# Patient Record
Sex: MALE | Race: WHITE | NOT HISPANIC OR LATINO | Employment: FULL TIME | URBAN - METROPOLITAN AREA
[De-identification: names, ages, dates, MRNs, and addresses within clinical notes are randomized per-mention and may not be internally consistent; named-entity substitution may affect disease eponyms.]

---

## 2017-01-04 ENCOUNTER — ALLSCRIPTS OFFICE VISIT (OUTPATIENT)
Dept: OTHER | Facility: OTHER | Age: 52
End: 2017-01-04

## 2017-01-04 ENCOUNTER — GENERIC CONVERSION - ENCOUNTER (OUTPATIENT)
Dept: OTHER | Facility: OTHER | Age: 52
End: 2017-01-04

## 2017-04-12 ENCOUNTER — ALLSCRIPTS OFFICE VISIT (OUTPATIENT)
Dept: OTHER | Facility: OTHER | Age: 52
End: 2017-04-12

## 2017-04-12 DIAGNOSIS — M25.50 PAIN IN JOINT: ICD-10-CM

## 2017-04-12 DIAGNOSIS — M54.50 LOW BACK PAIN: ICD-10-CM

## 2017-04-12 DIAGNOSIS — M54.16 RADICULOPATHY OF LUMBAR REGION: ICD-10-CM

## 2017-04-13 ENCOUNTER — APPOINTMENT (OUTPATIENT)
Dept: LAB | Facility: HOSPITAL | Age: 52
End: 2017-04-13
Payer: COMMERCIAL

## 2017-04-13 ENCOUNTER — TRANSCRIBE ORDERS (OUTPATIENT)
Dept: ADMINISTRATIVE | Facility: HOSPITAL | Age: 52
End: 2017-04-13

## 2017-04-13 DIAGNOSIS — M25.50 PAIN IN JOINT: ICD-10-CM

## 2017-04-13 DIAGNOSIS — M54.16 RADICULOPATHY OF LUMBAR REGION: ICD-10-CM

## 2017-04-13 DIAGNOSIS — M54.50 LOW BACK PAIN: ICD-10-CM

## 2017-04-13 LAB
CK SERPL-CCNC: 118 U/L (ref 39–308)
CRP SERPL QL: 5.8 MG/L
ERYTHROCYTE [DISTWIDTH] IN BLOOD BY AUTOMATED COUNT: 12.4 % (ref 11.6–15.1)
ERYTHROCYTE [SEDIMENTATION RATE] IN BLOOD: 3 MM/HOUR (ref 0–10)
HCT VFR BLD AUTO: 52.1 % (ref 36.5–49.3)
HGB BLD-MCNC: 17.6 G/DL (ref 12–17)
MCH RBC QN AUTO: 33 PG (ref 26.8–34.3)
MCHC RBC AUTO-ENTMCNC: 33.8 G/DL (ref 31.4–37.4)
MCV RBC AUTO: 98 FL (ref 82–98)
PLATELET # BLD AUTO: 212 THOUSANDS/UL (ref 149–390)
PMV BLD AUTO: 10.9 FL (ref 8.9–12.7)
RBC # BLD AUTO: 5.34 MILLION/UL (ref 3.88–5.62)
URATE SERPL-MCNC: 9.6 MG/DL (ref 4.2–8)
WBC # BLD AUTO: 5.75 THOUSAND/UL (ref 4.31–10.16)

## 2017-04-13 PROCEDURE — 82550 ASSAY OF CK (CPK): CPT

## 2017-04-13 PROCEDURE — 86618 LYME DISEASE ANTIBODY: CPT

## 2017-04-13 PROCEDURE — 87798 DETECT AGENT NOS DNA AMP: CPT

## 2017-04-13 PROCEDURE — 86038 ANTINUCLEAR ANTIBODIES: CPT

## 2017-04-13 PROCEDURE — 82085 ASSAY OF ALDOLASE: CPT

## 2017-04-13 PROCEDURE — 36415 COLL VENOUS BLD VENIPUNCTURE: CPT

## 2017-04-13 PROCEDURE — 86235 NUCLEAR ANTIGEN ANTIBODY: CPT

## 2017-04-13 PROCEDURE — 87476 LYME DIS DNA AMP PROBE: CPT

## 2017-04-13 PROCEDURE — 86256 FLUORESCENT ANTIBODY TITER: CPT

## 2017-04-13 PROCEDURE — 86430 RHEUMATOID FACTOR TEST QUAL: CPT

## 2017-04-13 PROCEDURE — 85027 COMPLETE CBC AUTOMATED: CPT

## 2017-04-13 PROCEDURE — 86039 ANTINUCLEAR ANTIBODIES (ANA): CPT

## 2017-04-13 PROCEDURE — 85652 RBC SED RATE AUTOMATED: CPT

## 2017-04-13 PROCEDURE — 81374 HLA I TYPING 1 ANTIGEN LR: CPT

## 2017-04-13 PROCEDURE — 86225 DNA ANTIBODY NATIVE: CPT

## 2017-04-13 PROCEDURE — 86140 C-REACTIVE PROTEIN: CPT

## 2017-04-13 PROCEDURE — 82164 ANGIOTENSIN I ENZYME TEST: CPT

## 2017-04-13 PROCEDURE — 84550 ASSAY OF BLOOD/URIC ACID: CPT

## 2017-04-14 LAB
ACE SERPL-CCNC: 34 U/L (ref 14–82)
ALDOLASE SERPL-CCNC: 9.1 U/L (ref 3.3–10.3)
ANA HOMOGEN SER QL IF: NORMAL
ANA HOMOGEN TITR SER: NORMAL {TITER}
B BURGDOR IGG SER IA-ACNC: 0.09
B BURGDOR IGM SER IA-ACNC: 0.15
DSDNA AB SER-ACNC: <1 IU/ML (ref 0–9)
ENA SS-A AB SER-ACNC: <0.2 AI (ref 0–0.9)
ENA SS-B AB SER-ACNC: <0.2 AI (ref 0–0.9)
MITOCHONDRIA M2 IGG SER-ACNC: 2.1 UNITS (ref 0–20)
RHEUMATOID FACT SER QL LA: NEGATIVE
RYE IGE QN: POSITIVE

## 2017-04-16 LAB — B BURGDOR DNA SPEC QL NAA+PROBE: NEGATIVE

## 2017-04-17 LAB — A PHAGOCYTOPH DNA BLD QL NAA+PROBE: NEGATIVE

## 2017-04-21 ENCOUNTER — GENERIC CONVERSION - ENCOUNTER (OUTPATIENT)
Dept: OTHER | Facility: OTHER | Age: 52
End: 2017-04-21

## 2017-04-21 LAB — HLA-B27 QL NAA+PROBE: NEGATIVE

## 2017-06-05 ENCOUNTER — APPOINTMENT (OUTPATIENT)
Dept: LAB | Facility: HOSPITAL | Age: 52
End: 2017-06-05
Payer: COMMERCIAL

## 2017-06-05 ENCOUNTER — GENERIC CONVERSION - ENCOUNTER (OUTPATIENT)
Dept: OTHER | Facility: OTHER | Age: 52
End: 2017-06-05

## 2017-06-05 ENCOUNTER — TRANSCRIBE ORDERS (OUTPATIENT)
Dept: ADMINISTRATIVE | Facility: HOSPITAL | Age: 52
End: 2017-06-05

## 2017-06-05 DIAGNOSIS — M10.9 GOUT: ICD-10-CM

## 2017-06-05 LAB — URATE SERPL-MCNC: 8.3 MG/DL (ref 4.2–8)

## 2017-06-05 PROCEDURE — 84550 ASSAY OF BLOOD/URIC ACID: CPT

## 2017-06-05 PROCEDURE — 36415 COLL VENOUS BLD VENIPUNCTURE: CPT

## 2017-06-21 DIAGNOSIS — M10.9 GOUT: ICD-10-CM

## 2017-09-05 ENCOUNTER — ALLSCRIPTS OFFICE VISIT (OUTPATIENT)
Dept: OTHER | Facility: OTHER | Age: 52
End: 2017-09-05

## 2017-09-05 DIAGNOSIS — R05.9 COUGH: ICD-10-CM

## 2017-09-06 ENCOUNTER — TRANSCRIBE ORDERS (OUTPATIENT)
Dept: ADMINISTRATIVE | Facility: HOSPITAL | Age: 52
End: 2017-09-06

## 2017-09-06 ENCOUNTER — HOSPITAL ENCOUNTER (OUTPATIENT)
Dept: RADIOLOGY | Facility: HOSPITAL | Age: 52
Discharge: HOME | End: 2017-09-06
Payer: COMMERCIAL

## 2017-09-06 DIAGNOSIS — R05.9 COUGH: ICD-10-CM

## 2017-09-06 DIAGNOSIS — R05.9 COUGH: Primary | ICD-10-CM

## 2017-09-06 PROCEDURE — 71020 HB CHEST X-RAY 2VW FRONTAL&LATL: CPT

## 2017-09-08 ENCOUNTER — GENERIC CONVERSION - ENCOUNTER (OUTPATIENT)
Dept: OTHER | Facility: OTHER | Age: 52
End: 2017-09-08

## 2018-01-10 NOTE — RESULT NOTES
Verified Results  (1) HLA B27 13Apr2017 11:00AM Asif Stover    Order Number: [id number]     Test Name Result Flag Reference   HLA B27 Negative     HLA-B*27 Negative  HLA allele interpretation for all loci based on IMGT/HLA  database version 3 25 0  [facility] CLIA ID Number [id number]  This test was performed using PCR (Polymerase Chain Reaction)/SSOP  (Sequence Specific Oligonucleotide Probes) technique  SBT (Sequence  Based Typing) and/or SSP (Sequence Specific Primers) may be used as  supplemental methods when necessary  Please contact HLA Customer  Service at [phone] if you have any questions     Director of [facility]   Dr Amandeep Ferrell, PhD    Performed at:  [address]  [id number]  : Anyi Campos PhD, Phone:  [phone]

## 2018-01-12 NOTE — RESULT NOTES
Verified Results  (1) URIC ACID [id number] 10:43AM [facility] [address] Order Number: [id number]     Test Name Result Flag Reference   URIC ACID 8 3 mg/dL H 4 2-8 0   Specimen collection should occur prior to Metamizole administration due to the potential for falsely depressed results

## 2018-01-13 VITALS
TEMPERATURE: 100.1 F | HEIGHT: 71 IN | DIASTOLIC BLOOD PRESSURE: 82 MMHG | WEIGHT: 263.25 LBS | SYSTOLIC BLOOD PRESSURE: 124 MMHG | HEART RATE: 71 BPM | BODY MASS INDEX: 36.85 KG/M2 | OXYGEN SATURATION: 97 %

## 2018-01-14 VITALS
OXYGEN SATURATION: 94 % | HEART RATE: 94 BPM | DIASTOLIC BLOOD PRESSURE: 62 MMHG | SYSTOLIC BLOOD PRESSURE: 126 MMHG | BODY MASS INDEX: 36.05 KG/M2 | HEIGHT: 71 IN | WEIGHT: 257.5 LBS | TEMPERATURE: 98.2 F

## 2018-01-16 NOTE — RESULT NOTES
Verified Results  * XR CHEST PA & LATERAL [id number] 12:26PM [facility]     Test Name Result Flag Reference   XR CHEST PA & LATERAL (Report)     CHEST      INDICATION: Cough     COMPARISON: None     VIEWS: Frontal and lateral projections     IMAGES: 4     FINDINGS:        Cardiomediastinal silhouette appears unremarkable  The lungs are clear  No pneumothorax or pleural effusion  Visualized osseous structures appear within normal limits for the patient's age         IMPRESSION:     No acute consolidation or congestion seen   Hypoventilated lungs       Workstation performed: [id number]     Signed by:   Roland Colón MD   9/8/17

## 2018-01-22 VITALS
BODY MASS INDEX: 37.27 KG/M2 | HEART RATE: 97 BPM | OXYGEN SATURATION: 95 % | TEMPERATURE: 98.9 F | HEIGHT: 71 IN | WEIGHT: 266.25 LBS

## 2018-02-13 ENCOUNTER — APPOINTMENT (OUTPATIENT)
Dept: LAB | Facility: HOSPITAL | Age: 53
End: 2018-02-13
Attending: INTERNAL MEDICINE
Payer: COMMERCIAL

## 2018-02-13 ENCOUNTER — OFFICE VISIT (OUTPATIENT)
Dept: INTERNAL MEDICINE CLINIC | Facility: CLINIC | Age: 53
End: 2018-02-13
Payer: COMMERCIAL

## 2018-02-13 ENCOUNTER — HOSPITAL ENCOUNTER (OUTPATIENT)
Dept: RADIOLOGY | Facility: HOSPITAL | Age: 53
Discharge: HOME | End: 2018-02-13
Attending: INTERNAL MEDICINE
Payer: COMMERCIAL

## 2018-02-13 VITALS
DIASTOLIC BLOOD PRESSURE: 85 MMHG | HEIGHT: 71 IN | HEART RATE: 85 BPM | SYSTOLIC BLOOD PRESSURE: 135 MMHG | OXYGEN SATURATION: 98 % | TEMPERATURE: 98.9 F | BODY MASS INDEX: 37.52 KG/M2 | WEIGHT: 268 LBS

## 2018-02-13 DIAGNOSIS — R74.01 ELEVATED ALT MEASUREMENT: Primary | ICD-10-CM

## 2018-02-13 DIAGNOSIS — M54.42 ACUTE MIDLINE LOW BACK PAIN WITH BILATERAL SCIATICA: ICD-10-CM

## 2018-02-13 DIAGNOSIS — E78.5 HYPERLIPIDEMIA, UNSPECIFIED HYPERLIPIDEMIA TYPE: ICD-10-CM

## 2018-02-13 DIAGNOSIS — R79.89 ELEVATED TSH: Primary | ICD-10-CM

## 2018-02-13 DIAGNOSIS — I10 HYPERTENSION, UNSPECIFIED TYPE: ICD-10-CM

## 2018-02-13 DIAGNOSIS — M10.9 GOUT, UNSPECIFIED CAUSE, UNSPECIFIED CHRONICITY, UNSPECIFIED SITE: ICD-10-CM

## 2018-02-13 DIAGNOSIS — E83.52 HYPERCALCEMIA: ICD-10-CM

## 2018-02-13 DIAGNOSIS — M54.41 ACUTE MIDLINE LOW BACK PAIN WITH BILATERAL SCIATICA: ICD-10-CM

## 2018-02-13 DIAGNOSIS — E87.6 HYPOKALEMIA: ICD-10-CM

## 2018-02-13 PROBLEM — M54.16 LUMBAR RADICULOPATHY: Status: ACTIVE | Noted: 2017-04-12

## 2018-02-13 PROBLEM — M25.50 ARTHRALGIA: Status: ACTIVE | Noted: 2017-04-12

## 2018-02-13 PROBLEM — R59.9 ENLARGED LYMPH NODE: Status: ACTIVE | Noted: 2017-09-05

## 2018-02-13 PROBLEM — M54.50 LOW BACK PAIN: Status: ACTIVE | Noted: 2017-04-12

## 2018-02-13 PROBLEM — M54.50 LUMBAR PAIN: Status: ACTIVE | Noted: 2018-02-13

## 2018-02-13 LAB
ALBUMIN SERPL BCP-MCNC: 4.2 G/DL (ref 3.5–5)
ALP SERPL-CCNC: 65 U/L (ref 46–116)
ALT SERPL W P-5'-P-CCNC: 103 U/L (ref 12–78)
ANION GAP SERPL CALCULATED.3IONS-SCNC: 14 MMOL/L (ref 4–13)
AST SERPL W P-5'-P-CCNC: 57 U/L (ref 5–45)
BASOPHILS # BLD AUTO: 0.05 THOUSANDS/ΜL (ref 0–0.1)
BASOPHILS NFR BLD AUTO: 1 % (ref 0–1)
BILIRUB SERPL-MCNC: 0.8 MG/DL (ref 0.2–1)
BUN SERPL-MCNC: 16 MG/DL (ref 5–25)
CALCIUM ALBUM COR SERPL-MCNC: 10.1 MG/DL (ref 8.3–10.1)
CALCIUM SERPL-MCNC: 10.3 MG/DL (ref 8.3–10.1)
CHLORIDE SERPL-SCNC: 101 MMOL/L (ref 100–108)
CHOLEST SERPL-MCNC: 290 MG/DL (ref 50–200)
CO2 SERPL-SCNC: 25 MMOL/L (ref 21–32)
CREAT SERPL-MCNC: 1.07 MG/DL (ref 0.6–1.3)
EOSINOPHIL # BLD AUTO: 0.19 THOUSAND/ΜL (ref 0–0.61)
EOSINOPHIL NFR BLD AUTO: 3 % (ref 0–6)
ERYTHROCYTE [DISTWIDTH] IN BLOOD BY AUTOMATED COUNT: 12.3 % (ref 11.6–15.1)
GFR SERPL CREATININE-BSD FRML MDRD: 78 ML/MIN/1.73SQ M
GLUCOSE P FAST SERPL-MCNC: 108 MG/DL (ref 65–99)
HCT VFR BLD AUTO: 53.2 % (ref 36.5–49.3)
HDLC SERPL-MCNC: 39 MG/DL (ref 40–60)
HGB BLD-MCNC: 18.3 G/DL (ref 12–17)
LDLC SERPL CALC-MCNC: 218 MG/DL (ref 0–100)
LYMPHOCYTES # BLD AUTO: 2.87 THOUSANDS/ΜL (ref 0.6–4.47)
LYMPHOCYTES NFR BLD AUTO: 38 % (ref 14–44)
MCH RBC QN AUTO: 32 PG (ref 26.8–34.3)
MCHC RBC AUTO-ENTMCNC: 34.4 G/DL (ref 31.4–37.4)
MCV RBC AUTO: 93 FL (ref 82–98)
MONOCYTES # BLD AUTO: 0.5 THOUSAND/ΜL (ref 0.17–1.22)
MONOCYTES NFR BLD AUTO: 7 % (ref 4–12)
NEUTROPHILS # BLD AUTO: 3.97 THOUSANDS/ΜL (ref 1.85–7.62)
NEUTS SEG NFR BLD AUTO: 52 % (ref 43–75)
NRBC BLD AUTO-RTO: 0 /100 WBCS
PLATELET # BLD AUTO: 223 THOUSANDS/UL (ref 149–390)
PMV BLD AUTO: 11.4 FL (ref 8.9–12.7)
POTASSIUM SERPL-SCNC: 3.4 MMOL/L (ref 3.5–5.3)
PROT SERPL-MCNC: 8.4 G/DL (ref 6.4–8.2)
RBC # BLD AUTO: 5.72 MILLION/UL (ref 3.88–5.62)
SODIUM SERPL-SCNC: 140 MMOL/L (ref 136–145)
T4 FREE SERPL-MCNC: 1.1 NG/DL (ref 0.76–1.46)
TRIGL SERPL-MCNC: 163 MG/DL
TSH SERPL DL<=0.05 MIU/L-ACNC: 6.08 UIU/ML (ref 0.36–3.74)
URATE SERPL-MCNC: 6.8 MG/DL (ref 4.2–8)
WBC # BLD AUTO: 7.59 THOUSAND/UL (ref 4.31–10.16)

## 2018-02-13 PROCEDURE — 80053 COMPREHEN METABOLIC PANEL: CPT

## 2018-02-13 PROCEDURE — 36415 COLL VENOUS BLD VENIPUNCTURE: CPT

## 2018-02-13 PROCEDURE — 84439 ASSAY OF FREE THYROXINE: CPT

## 2018-02-13 PROCEDURE — 3079F DIAST BP 80-89 MM HG: CPT | Performed by: INTERNAL MEDICINE

## 2018-02-13 PROCEDURE — 85025 COMPLETE CBC W/AUTO DIFF WBC: CPT

## 2018-02-13 PROCEDURE — 72100 X-RAY EXAM L-S SPINE 2/3 VWS: CPT

## 2018-02-13 PROCEDURE — 84443 ASSAY THYROID STIM HORMONE: CPT

## 2018-02-13 PROCEDURE — 80061 LIPID PANEL: CPT

## 2018-02-13 PROCEDURE — 99214 OFFICE O/P EST MOD 30 MIN: CPT | Performed by: INTERNAL MEDICINE

## 2018-02-13 PROCEDURE — 3075F SYST BP GE 130 - 139MM HG: CPT | Performed by: INTERNAL MEDICINE

## 2018-02-13 PROCEDURE — 84550 ASSAY OF BLOOD/URIC ACID: CPT

## 2018-02-13 RX ORDER — ATORVASTATIN CALCIUM 10 MG/1
20 TABLET, FILM COATED ORAL DAILY
Qty: 30 TABLET | Refills: 5 | Status: SHIPPED | OUTPATIENT
Start: 2018-02-13 | End: 2018-09-26 | Stop reason: SDUPTHER

## 2018-02-13 RX ORDER — METOPROLOL SUCCINATE 100 MG/1
1 TABLET, EXTENDED RELEASE ORAL DAILY
COMMUNITY
Start: 2016-01-03 | End: 2018-05-31

## 2018-02-13 RX ORDER — PREDNISONE 10 MG/1
TABLET ORAL
Qty: 6 TABLET | Refills: 0 | Status: SHIPPED | OUTPATIENT
Start: 2018-02-13 | End: 2018-02-20 | Stop reason: SDUPTHER

## 2018-02-13 RX ORDER — CHLORTHALIDONE 25 MG/1
1 TABLET ORAL DAILY
COMMUNITY
Start: 2015-12-02 | End: 2018-05-31

## 2018-02-13 RX ORDER — HYDROCODONE BITARTRATE AND ACETAMINOPHEN 5; 325 MG/1; MG/1
1 TABLET ORAL EVERY 6 HOURS PRN
Qty: 30 TABLET | Refills: 0 | Status: SHIPPED | OUTPATIENT
Start: 2018-02-13 | End: 2018-05-31

## 2018-02-13 RX ORDER — LISINOPRIL 40 MG/1
1 TABLET ORAL DAILY
COMMUNITY
Start: 2014-02-06 | End: 2018-05-31

## 2018-02-13 NOTE — PATIENT INSTRUCTIONS
Lower Back Exercises   WHAT YOU NEED TO KNOW:   What do I need to know about lower back exercises? Lower back exercises help heal and strengthen your back muscles to prevent another injury  Ask your healthcare provider if you need to see a physical therapist for more advanced exercises  · Do the exercises on a mat or firm surface  (not on a bed) to support your spine and prevent low back pain  · Move slowly and smoothly  Avoid fast or jerky motions  · Breathe normally  Do not hold your breath  · Stop if you feel pain  It is normal to feel some discomfort at first  Regular exercise will help decrease your discomfort over time  How do I perform lower back exercises safely? Your healthcare provider may recommend that you do back exercises 10 to 30 minutes each day  He may also recommend that you do exercises 1 to 3 times each day  Ask your healthcare provider which exercises are best for you and how often to do them  · Ankle pumps:  Lie on your back  Move your foot up (with your toes pointing toward your head)  Then, move your foot down (with your toes pointing away from you)  Repeat this exercise 10 times on each side  · Heel slides:  Lie on your back  Slowly bend one leg and then straighten it  Next, bend the other leg and then straighten it  Repeat 10 times on each side  · Pelvic tilt:  Lie on your back with your knees bent and feet flat on the floor  Place your arms in a relaxed position beside your body  Tighten the muscles of your abdomen and flatten your back against the floor  Hold for 5 seconds  Repeat 5 times  · Back stretch:  Lie on your back with your hands behind your head  Bend your knees and turn the lower half of your body to one side  Hold this position for 10 seconds  Repeat 3 times on each side  · Straight leg raises:  Lie on your back with one leg straight  Bend the other knee   Tighten your abdomen and then slowly lift the straight leg up about 6 to 12 inches off the floor  Hold for 1 to 5 seconds  Lower your leg slowly  Repeat 10 times on each leg  · Knee-to-chest:  Lie on your back with your knees bent and feet flat on the floor  Pull one of your knees toward your chest and hold it there for 5 seconds  Return your leg to the starting position  Lift the other knee toward your chest and hold for 5 seconds  Do this 5 times on each side  · Cat and camel:  Place your hands and knees on the floor  Arch your back upward toward the ceiling and lower your head  Round out your spine as much as you can  Hold for 5 seconds  Lift your head upward and push your chest downward toward the floor  Hold for 5 seconds  Do 3 sets or as directed  · Wall squats:  Stand with your back against a wall  Tighten the muscles of your abdomen  Slowly lower your body until your knees are bent at a 45 degree angle  Hold this position for 5 seconds  Slowly move back up to a standing position  Repeat 10 times  · Curl up:  Lie on your back with your knees bent and feet flat on the floor  Place your hands, palms down, underneath the curve in your lower back  Next, with your elbows on the floor, lift your shoulders and chest 2 to 3 inches  Keep your head in line with your shoulders  Hold this position for 5 seconds  When you can do this exercise without pain for 10 to 15 seconds, you may add a rotation  While your shoulders and chest are lifted off the ground, turn slightly to the left and hold  Repeat on the other side  · Bird dog:  Place your hands and knees on the floor  Keep your wrists directly below your shoulders and your knees directly below your hips  Pull your belly button in toward your spine  Do not flatten or arch your back  Tighten your abdominal muscles  Raise one arm straight out so that it is aligned with your head  Next, raise the leg opposite your arm  Hold this position for 15 seconds   Lower your arm and leg slowly and change sides  Do 5 sets  When should I seek immediate care? · You have severe pain that prevents you from moving  When should I contact my healthcare provider? · Your pain becomes worse  · You have new pain  · You have questions or concerns about your condition or care  CARE AGREEMENT:   You have the right to help plan your care  Learn about your health condition and how it may be treated  Discuss treatment options with your caregivers to decide what care you want to receive  You always have the right to refuse treatment  The above information is an  only  It is not intended as medical advice for individual conditions or treatments  Talk to your doctor, nurse or pharmacist before following any medical regimen to see if it is safe and effective for you  © 2017 [address]  Information is for End User's use only and may not be sold, redistributed or otherwise used for commercial purposes  All illustrations and images included in CareNotes® are the copyrighted property of [employer]  or [address]

## 2018-02-13 NOTE — PROGRESS NOTES
Assessment/Plan:    1  Hypertension blood pressure is minimally elevated will continue present medications recheck in 2 weeks  2  Patient with severe low back pain slip but that the fall will use ice alternating with heat for 20 minutes at a time start with exercises stop Bed rest to get around as tolerated  3  Patient with hyperlipidemia restart statin  4  Patient with history of gout he does not remember any gout attacks if he does have gout attacks will need to stop chlorthalidone severe lumbar pain   5 time of diagnosis patient has an low potassium minimally as well as a high calcium will repeat testing in 1 week  6  Patient with abnormal liver function test to see Dr Jenni Hutchinson alcohol cessation recommended  Diagnoses and all orders for this visit:    Elevated ALT measurement    Gout, unspecified cause, unspecified chronicity, unspecified site  -     Uric acid; Future    Hyperlipidemia, unspecified hyperlipidemia type  -     CBC and differential; Future  -     TSH, 3rd generation with T4 reflex; Future  -     atorvastatin (LIPITOR) 10 mg tablet; Take 2 tablets (20 mg total) by mouth daily  -     Lipid Panel with Direct LDL reflex; Future    Acute midline low back pain with bilateral sciatica  -     XR spine lumbar 2 or 3 views injury; Future  -     predniSONE 10 mg tablet; 1 tabs bid for 3 days  -     HYDROcodone-acetaminophen (NORCO) 5-325 mg per tablet; Take 1 tablet by mouth every 6 (six) hours as needed for pain Max Daily Amount: 4 tablets    Hypertension, unspecified type  -     Comprehensive metabolic panel; Future  -     TSH, 3rd generation with T4 reflex; Future    Other orders  -     lisinopril (ZESTRIL) 40 mg tablet; Take 1 tablet by mouth daily  -     chlorthalidone 25 mg tablet; Take 1 tablet by mouth daily  -     metoprolol succinate (TOPROL-XL) 100 mg 24 hr tablet;  Take 1 tablet by mouth daily        The patient was counseled regarding instructions for management, risk factor reductions, patient and family education,impressions, risks and benefits of treatment options, side effects of medications, importance of compliance with treatment  The treatment plan was reviewed with the patient/guardian and patient/guardian understands and agrees with the treatment plan  Subjective:      Patient ID: Jovita Navarro is a 45 y o  male  5 days ago slipped on ice 3 days ago woke up pain back walk 1-2 steps with severe pain, no weakness, no urinary or bowel incontinence, bed rest, ibuprofen,  No ice or cold  Compliant with medication, screeming pain in office,pain level 10 at best 8    He twisted when he slipped with did not fall        The following portions of the patient's history were reviewed and updated as appropriate:   He has no past medical history on file  ,   does not have any pertinent problems on file  ,   has no past surgical history on file  ,  family history is not on file  ,   reports that he has never smoked  He has never used smokeless tobacco  He reports that he drinks alcohol  He reports that he does not use drugs  ,  has No Known Allergies       Review of Systems   Constitutional: Negative for appetite change, chills, fatigue, fever and unexpected weight change  HENT: Negative for congestion, ear pain, facial swelling, hearing loss, mouth sores, nosebleeds, postnasal drip, rhinorrhea, sinus pain, sore throat, trouble swallowing and voice change  Eyes: Negative for pain, discharge, redness and visual disturbance  Respiratory: Negative for apnea, chest tightness, shortness of breath, wheezing and stridor  Cardiovascular: Negative for chest pain, palpitations and leg swelling  Gastrointestinal: Negative for abdominal distention, abdominal pain, blood in stool, constipation, diarrhea and vomiting  Endocrine: Negative for cold intolerance, heat intolerance, polydipsia, polyphagia and polyuria     Genitourinary: Negative for difficulty urinating, dysuria, flank pain, frequency, genital sores, hematuria and urgency  Musculoskeletal: Positive for back pain  Negative for arthralgias  Skin: Negative for rash and wound  Allergic/Immunologic: Negative for environmental allergies, food allergies and immunocompromised state  Neurological: Negative for dizziness, tremors, seizures, syncope, facial asymmetry, speech difficulty, weakness, light-headedness, numbness and headaches  Hematological: Negative for adenopathy  Does not bruise/bleed easily  Psychiatric/Behavioral: Negative for agitation, behavioral problems, dysphoric mood, hallucinations, self-injury, sleep disturbance and suicidal ideas  The patient is not hyperactive  Objective:     Physical Exam   Constitutional: He is oriented to person, place, and time  He appears well-developed  HENT:   Right Ear: External ear normal    Left Ear: External ear normal    Eyes: Right eye exhibits no discharge  Left eye exhibits no discharge  No scleral icterus  Neck: Carotid bruit is not present  No tracheal deviation present  No thyroid mass and no thyromegaly present  Cardiovascular: Normal rate, regular rhythm, normal heart sounds and intact distal pulses  Exam reveals no gallop and no friction rub  No murmur heard  Pulmonary/Chest: No respiratory distress  He has no wheezes  He has no rales  Musculoskeletal: He exhibits no edema  Patient with difficulty getting up off of exam chair as screams in pain when going from the sitting to the standing position no lower extremity weakness no pain with percussion of the lumbar spine or the coccyx   Lymphadenopathy:     He has no cervical adenopathy  Neurological: He is alert and oriented to person, place, and time  Coordination normal    Psychiatric: He has a normal mood and affect  His behavior is normal  Judgment and thought content normal    Nursing note and vitals reviewed

## 2018-02-14 ENCOUNTER — TELEPHONE (OUTPATIENT)
Dept: INTERNAL MEDICINE CLINIC | Facility: CLINIC | Age: 53
End: 2018-02-14

## 2018-02-15 ENCOUNTER — TELEPHONE (OUTPATIENT)
Dept: INTERNAL MEDICINE CLINIC | Facility: CLINIC | Age: 53
End: 2018-02-15

## 2018-02-15 NOTE — TELEPHONE ENCOUNTER
Daughter called about her father    Says we want him to go for another round of labs,  shes still mae waiting on an update on things apparently  Claims she doesn't even know the results of the xray, and they dont think they can even get the tomás to the lab in his condition  What to do next? ?     Cell isacc - [phone]

## 2018-02-20 DIAGNOSIS — M54.41 ACUTE MIDLINE LOW BACK PAIN WITH BILATERAL SCIATICA: ICD-10-CM

## 2018-02-20 DIAGNOSIS — M54.42 ACUTE MIDLINE LOW BACK PAIN WITH BILATERAL SCIATICA: ICD-10-CM

## 2018-02-20 RX ORDER — PREDNISONE 10 MG/1
TABLET ORAL
Qty: 6 TABLET | Refills: 0 | Status: SHIPPED | OUTPATIENT
Start: 2018-02-20 | End: 2018-03-19

## 2018-02-20 RX ORDER — PREDNISONE 10 MG/1
TABLET ORAL
Qty: 6 TABLET | Refills: 0 | OUTPATIENT
Start: 2018-02-20

## 2018-02-20 RX ORDER — PREDNISONE 10 MG/1
TABLET ORAL
Qty: 6 TABLET | Refills: 0 | Status: CANCELLED | OUTPATIENT
Start: 2018-02-20

## 2018-02-20 NOTE — TELEPHONE ENCOUNTER
Patient does not have an apt until Friday with pain management, would like a refill of prednisone to help with back pain, just a few day supply would be great

## 2018-02-20 NOTE — TELEPHONE ENCOUNTER
I have been inappropriately named as PCP for this patient  I have never seen, nor will I ever see this patient as their PCP

## 2018-02-23 VITALS
HEIGHT: 72 IN | WEIGHT: 268 LBS | BODY MASS INDEX: 36.3 KG/M2 | SYSTOLIC BLOOD PRESSURE: 156 MMHG | DIASTOLIC BLOOD PRESSURE: 82 MMHG | HEART RATE: 84 BPM

## 2018-02-23 DIAGNOSIS — M54.41 ACUTE MIDLINE LOW BACK PAIN WITH BILATERAL SCIATICA: Primary | ICD-10-CM

## 2018-02-23 DIAGNOSIS — M54.42 ACUTE MIDLINE LOW BACK PAIN WITH BILATERAL SCIATICA: Primary | ICD-10-CM

## 2018-02-23 DIAGNOSIS — M62.830 LUMBAR PARASPINAL MUSCLE SPASM: ICD-10-CM

## 2018-02-23 PROCEDURE — 99203 OFFICE O/P NEW LOW 30 MIN: CPT | Performed by: FAMILY MEDICINE

## 2018-02-23 RX ORDER — CYCLOBENZAPRINE HCL 10 MG
10 TABLET ORAL 3 TIMES DAILY PRN
Qty: 30 TABLET | Refills: 1 | Status: SHIPPED | OUTPATIENT
Start: 2018-02-23 | End: 2018-05-31

## 2018-02-23 NOTE — PROGRESS NOTES
Assessment/Plan:  Assessment/Plan   Diagnoses and all orders for this visit:    Acute midline low back pain with bilateral sciatica  -     MRI lumbar spine wo contrast; Future    Lumbar paraspinal muscle spasm  -     cyclobenzaprine (FLEXERIL) 10 mg tablet; Take 1 tablet (10 mg total) by mouth 3 (three) times a day as needed for muscle spasms        45year old male with sudden onset low back pain after injury  Discussed with patient physical exam, radiographs, impression and plan  X-rays are unremarkable for acute osseous abnormality  Physical exam is noted for significant lumbosacral tenderness and positive straight leg raise test with both lower extremities which raises concern for disc pathology/nerve root impingement  I will refer him for MRI to evaluate for disc disease  He does not like pain medication so I will give him muscle relaxant  He is to take cyclobenzaprine 10 mg up to three times a day as needed  He will follow-up with me after getting this done  Subjective:   Patient ID: Noe Molina is a 45 y o  male  Chief Complaint   Patient presents with    Lower Back - Pain       66-year-old male presents for evaluation of low back pain of 2 weeks duration  He had slipped on ice and caught his balance to prevent from falling  In the process he felt a twist in his low back and afterward felt soreness in his low back  A few days later he went to bed with only soreness in his low back and the next morning he woke up and couldn't move due to significant pain in his back  Pain was described as electric-like, lumbosacral, radiating down to both feet and up the spine, constant, and 10 out of 10 intensity  He has tried laying in different positions but has had difficulty getting comfortable  He denies any bowel or bladder incontinence  He went to see his PCP who prescribed him oral steroids and pain medication which provided some minor relief  He was sent for X-rays and bood work   He reports that he has still bee having difficulty with sleep  He also has difficulty with sitting up and standing due to the pain  He ambulates very slowly due to the pain  He's had minor low back aches in the past but nothing like this  Back Pain   This is a new problem  Episode onset: 2 weeks  The problem occurs constantly  The problem has been unchanged  Associated symptoms include numbness  Pertinent negatives include no abdominal pain, chest pain, chills, fever, rash, sore throat or weakness  He has tried rest (Steroids) for the symptoms  The treatment provided no relief  The following portions of the patient's history were reviewed and updated as appropriate: He  has a past medical history of Hypertension  He  has no past surgical history on file  His family history includes Cancer in his mother; Heart disease in his father  He  reports that he has never smoked  He has never used smokeless tobacco  He reports that he drinks alcohol  He reports that he does not use drugs  He has No Known Allergies       Review of Systems   Constitutional: Negative for chills and fever  HENT: Negative for sore throat  Eyes: Negative for visual disturbance  Respiratory: Negative for shortness of breath  Cardiovascular: Negative for chest pain  Gastrointestinal: Negative for abdominal pain  Genitourinary: Negative for difficulty urinating  Musculoskeletal: Positive for back pain  Skin: Negative for rash and wound  Neurological: Positive for numbness  Negative for weakness  Hematological: Does not bruise/bleed easily  Psychiatric/Behavioral: Negative for self-injury  Objective:  Vitals:    02/23/18 1313   BP: 156/82   BP Location: Right arm   Patient Position: Sitting   Cuff Size: Large   Pulse: 84   Weight: 122 kg (268 lb)   Height: 6' (1 829 m)     Back Exam     Tenderness   The patient is experiencing tenderness in the lumbar (Midline lumbar L2-L5  Paraspinal bilateral L2-L5)      Tests   Straight leg raise right: positive  Straight leg raise left: positive    Other   Gait: Slow  Comments:  Range of motion unable to assess due to pain  Restricted in flexion, extension, sidebending, and rotation due to pain    Sensation in lower extremities intact  Normal strength of both lower extremities            Physical Exam   Constitutional: He is oriented to person, place, and time  He appears well-developed  He appears distressed (Discomfort due to low back pain)  HENT:   Head: Normocephalic and atraumatic  Eyes: Conjunctivae are normal    Neck: No tracheal deviation present  Cardiovascular: Normal rate  Pulmonary/Chest: Effort normal  No respiratory distress  Abdominal: He exhibits no distension  Neurological: He is alert and oriented to person, place, and time  Skin: Skin is warm and dry  Psychiatric: He has a normal mood and affect  His behavior is normal        I have personally reviewed pertinent films in PACS and my interpretation is No acute osseous abnormlity of lumbosacral spine  Loss of lordosis

## 2018-02-28 RX ORDER — INDOMETHACIN 50 MG/1
1 CAPSULE ORAL DAILY PRN
COMMUNITY
Start: 2017-09-05 | End: 2018-05-31

## 2018-03-05 DIAGNOSIS — E78.5 HYPERLIPIDEMIA: ICD-10-CM

## 2018-03-05 DIAGNOSIS — R05.9 COUGH: ICD-10-CM

## 2018-03-05 DIAGNOSIS — I10 ESSENTIAL (PRIMARY) HYPERTENSION: ICD-10-CM

## 2018-03-08 ENCOUNTER — TELEPHONE (OUTPATIENT)
Dept: OBGYN CLINIC | Facility: HOSPITAL | Age: 53
End: 2018-03-08

## 2018-03-08 NOTE — TELEPHONE ENCOUNTER
Patient sees Dr Ria Lyman  His daughter is calling to let the doctor know that the insurance denied his MRI  She is asking if they can be contacted in order to try and get it approved? What would his next steps be?

## 2018-03-19 ENCOUNTER — OFFICE VISIT (OUTPATIENT)
Dept: OBGYN CLINIC | Facility: CLINIC | Age: 53
End: 2018-03-19
Payer: COMMERCIAL

## 2018-03-19 VITALS
BODY MASS INDEX: 36.98 KG/M2 | SYSTOLIC BLOOD PRESSURE: 150 MMHG | HEIGHT: 72 IN | WEIGHT: 273 LBS | HEART RATE: 82 BPM | DIASTOLIC BLOOD PRESSURE: 91 MMHG

## 2018-03-19 DIAGNOSIS — M54.50 ACUTE MIDLINE LOW BACK PAIN WITHOUT SCIATICA: Primary | ICD-10-CM

## 2018-03-19 DIAGNOSIS — M62.830 LUMBAR PARASPINAL MUSCLE SPASM: ICD-10-CM

## 2018-03-19 DIAGNOSIS — S39.012D LUMBAR STRAIN, SUBSEQUENT ENCOUNTER: ICD-10-CM

## 2018-03-19 PROCEDURE — 99213 OFFICE O/P EST LOW 20 MIN: CPT | Performed by: FAMILY MEDICINE

## 2018-03-19 RX ORDER — PREDNISONE 20 MG/1
20 TABLET ORAL DAILY
Qty: 6 TABLET | Refills: 0 | Status: SHIPPED | OUTPATIENT
Start: 2018-03-19 | End: 2018-03-25

## 2018-03-19 RX ORDER — IBUPROFEN 800 MG/1
800 TABLET ORAL EVERY 8 HOURS PRN
Qty: 90 TABLET | Refills: 0 | Status: SHIPPED | OUTPATIENT
Start: 2018-03-19 | End: 2018-05-31

## 2018-03-19 RX ORDER — CYCLOBENZAPRINE HCL 10 MG
10 TABLET ORAL 3 TIMES DAILY PRN
Qty: 30 TABLET | Refills: 0 | Status: SHIPPED | OUTPATIENT
Start: 2018-03-19 | End: 2018-05-31

## 2018-03-19 NOTE — PROGRESS NOTES
Assessment/Plan:  Assessment/Plan   Diagnoses and all orders for this visit:    Acute midline low back pain without sciatica  -     predniSONE 20 mg tablet; Take 1 tablet (20 mg total) by mouth daily for 6 days  -     ibuprofen (MOTRIN) 800 mg tablet; Take 1 tablet (800 mg total) by mouth every 8 (eight) hours as needed for mild pain  -     Ambulatory referral to Physical Therapy; Future    Lumbar paraspinal muscle spasm  -     cyclobenzaprine (FLEXERIL) 10 mg tablet; Take 1 tablet (10 mg total) by mouth 3 (three) times a day as needed for muscle spasms  -     Ambulatory referral to Physical Therapy; Future    Lumbar strain, subsequent encounter  -     Ambulatory referral to Physical Therapy; Future       57-year-old male with low back pain  Discussed with patient physical exam, impression, and plan  His symptoms overall have not changed since previous visit  I will prescribe him prednisone 20 mg which she is to take once daily with food for 6 days, and he is to take ibuprofen 800 mg up to 3 times a day as needed,   And he may take cyclobenzaprine 10 mg at night and if tolerated up to 3 times a day  I will refer him to physical therapy which she is to start as soon as possible and do home exercises as directed  He will follow up with me in 6 weeks at which point he will be re-evaluated  Subjective:   Patient ID: Anthony Kennedy is a 45 y o  male  Chief Complaint   Patient presents with    Lower Back - Follow-up, Pain     45year old male following up for low back pain of more than one month duration, after slip and near fall injury  At previous visit he was prescribed muscle relaxant  Today reports pain has not changed since his last visit  Pain is localized midline the lumbosacral area, constant, with intermittent electric like radiation down to both ankles, associated with stiffness, and worse with walking    He denies any numbness /tingling of the extremities as well as any bowel or bladder incontinence  He has not had any new injury since his last visit  He reports having had mild temporary relief with taking the cyclobenzaprine  Still having midline lumbosacral pain with electric like radiation to both ankles  Worse with walking  Pain and stifness with movement  No incontinence  No numbness/tingling  Mild improvement with cyclobenzaprine  Back Pain   This is a new problem  The current episode started more than 1 month ago  The problem occurs constantly  The problem has been unchanged  Pertinent negatives include no abdominal pain, numbness or weakness  The symptoms are aggravated by walking  He has tried rest and relaxation for the symptoms  Review of Systems   Gastrointestinal: Negative for abdominal pain  Genitourinary: Negative for difficulty urinating  Musculoskeletal: Positive for back pain  Neurological: Negative for weakness and numbness  Objective:  Vitals:    03/19/18 0926   BP: 150/91   Pulse: 82   Weight: 124 kg (273 lb)   Height: 6' (1 829 m)     Right Hip Exam     Range of Motion   The patient has normal right hip ROM  Tests   BENNIE: negative    Comments:    Negative FADDIR      Left Hip Exam     Range of Motion   The patient has normal left hip ROM  Tests   BENNIE: negative    Comments:    Negative FADDIR      Back Exam     Tenderness   The patient is experiencing tenderness in the lumbar (Midline L3-L5, bilateral lumbar paraspinal hypertonicity )  Range of Motion   Extension: normal   Flexion: normal   Lateral Bend Right: normal   Lateral Bend Left: normal   Rotation Right: abnormal   Rotation Left: abnormal     Tests   Straight leg raise right: negative  Straight leg raise left: negative    Reflexes   Patellar: 2/4    Other   Sensation: normal  Gait: normal             Physical Exam   Constitutional: He is oriented to person, place, and time  He appears well-developed  No distress  HENT:   Head: Normocephalic and atraumatic     Eyes: Conjunctivae are normal    Neck: No tracheal deviation present  Cardiovascular: Normal rate  Pulmonary/Chest: Effort normal  No respiratory distress  Abdominal: He exhibits no distension  Neurological: He is alert and oriented to person, place, and time  Skin: Skin is warm and dry  Psychiatric: He has a normal mood and affect   His behavior is normal

## 2018-04-05 ENCOUNTER — EVALUATION (OUTPATIENT)
Dept: PHYSICAL THERAPY | Facility: CLINIC | Age: 53
End: 2018-04-05
Payer: COMMERCIAL

## 2018-04-05 DIAGNOSIS — M62.830 LUMBAR PARASPINAL MUSCLE SPASM: ICD-10-CM

## 2018-04-05 DIAGNOSIS — S39.012D LUMBAR STRAIN, SUBSEQUENT ENCOUNTER: ICD-10-CM

## 2018-04-05 DIAGNOSIS — M54.50 ACUTE MIDLINE LOW BACK PAIN WITHOUT SCIATICA: Primary | ICD-10-CM

## 2018-04-05 PROCEDURE — G8991 OTHER PT/OT GOAL STATUS: HCPCS | Performed by: PHYSICAL THERAPIST

## 2018-04-05 PROCEDURE — 97162 PT EVAL MOD COMPLEX 30 MIN: CPT | Performed by: PHYSICAL THERAPIST

## 2018-04-05 PROCEDURE — G8990 OTHER PT/OT CURRENT STATUS: HCPCS | Performed by: PHYSICAL THERAPIST

## 2018-04-05 NOTE — PROGRESS NOTES
PT Evaluation  and PT Discharge    Today's date: 2018  Patient name: Willa Hammans  : 1965  MRN: [id number]  Referring provider: Andreina Arias DO  Dx:   Encounter Diagnosis     ICD-10-CM    1  Acute midline low back pain without sciatica M54 5 Ambulatory referral to Physical Therapy   2  Lumbar paraspinal muscle spasm M62 830 Ambulatory referral to Physical Therapy   3  Lumbar strain, subsequent encounter [id number] Ambulatory referral to Physical Therapy                  Assessment  Impairments: abnormal or restricted ROM, activity intolerance, impaired physical strength, lacks appropriate home exercise program and pain with function    Assessment details: Willa Hammans is a 45year old male presenting to physical therapy with chief complaints of low back pain  He reports that sitting and lying down are comfortable for him but standing any walking are very painful, pain getting to 10/10 at times  He does not demonstrate a directional preference with repeated motions  He have decreased activation of his core muscluture, especially noted during bed mobility  He also demonstrates decreased postural awareness during sitting and standing activities  Standing posture is forward trunk lean with an inability to get into full lumbar extension  Secondary to his major functional limitations being in standing, he will be transferred to the [facility] for evaluation over the appropriateness of aquatics  Patient in agreement with POC  Understanding of Dx/Px/POC: good   Prognosis: good    Goals  STG  1  Decrease pain by at least two subjective ratings in 4 weeks  2  Increase ROM by at least 10 % in 4 weeks  LTG  1    Independent with HEP      Plan  Patient would benefit from: skilled PT  Treatment plan discussed with: patient  Plan details: Transfer care to [facility]        Subjective Evaluation    History of Present Illness  Mechanism of injury: Patient reports for the past two months he has been having "major back pain" with no SHELBY  He states that he went to bed one night and then woke up the following day with excruciating back pain  Two days prior, he reports that he did slip and twist on ice with no fall  He reported no back pain after the slip  He states that he feels like something is pinched in his lumbar spine  He reports that he was sent for a MRI but could not get one until trying physical therapy  He was prescribed a steroid and muscle relaxer, reports that the prednisone did help some but is wearing off  He reports a feeling of "lightning bolts" going from his ankles to the back of his head intermittently  He also reports having pain that he reports forces him to go to a chair instantly, otherwise, he feels like he might fall  He works on a farm, 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  He does report at home he has been using an inversion table which he thinks is helping some while being inverted, but as soon as he stands up, his pain returns  As far as positions of comfort, he does report that lying on his back is the most comfortable for him  Coughing and sneezing does increase his pain  He denies having any bowel or bladder incontinence  Does report weakness bilaterally in the lower extremities  Pain  Current pain ratin  At best pain ratin  At worst pain rating: 10          Objective     Special Questions      Additional Special Questions  No red flags present    Static Posture     Head  Forward  Shoulders  Rounded  Palpation   Left   Hypertonic in the erector spinae and quadratus lumborum  Right   Hypertonic in the erector spinae and quadratus lumborum       Neurological Testing     Sensation     Lumbar   Left   Intact: light touch    Right   Intact: light touch    Reflexes   Left   Patellar (L4): normal (2+)  Achilles (S1): normal (2+)  Babinski sign: negative  Clonus sign: negative    Right   Patellar (L4): normal (2+)  Achilles (S1): normal (2+)  Babinski sign: negative  Clonus sign: negative    Active Range of Motion   Left Knee   Normal active range of motion    Right Knee   Normal active range of motion  Left Ankle/Foot   Normal active range of motion    Right Ankle/Foot   Normal active range of motion    Additional Active Range of Motion Details  Lumbar flexion limited by 75%  Lumbar extension limited by 50%    Passive Range of Motion   Left Hip   Normal passive range of motion    Right Hip   Normal passive range of motion    Strength/Myotome Testing     Lumbar   Left   Heel walk: normal  Toe walk: normal    Right   Heel walk: normal  Toe walk: normal    Left Hip   Planes of Motion   Flexion: WFL  Extension: WFL  Abduction: WFL  Adduction: WFL    Right Hip   Planes of Motion   Flexion: WFL  Extension: WFL  Abduction: WFL  Adduction: WFL    Left Knee   Flexion: WFL  Extension: WFL    Right Knee   Flexion: WFL  Extension: WFL    Left Ankle/Foot   Dorsiflexion: WFL  Plantar flexion: WFL  Inversion: WFL  Eversion: WFL  Great toe extension: WFL  Right Ankle/Foot   Dorsiflexion: WFL  Plantar flexion: WFL  Inversion: WFL  Eversion: WFL  Great toe extension: WFL  Muscle Activation   Patient able to activate left transverse abdominals, left multifidus, right transverse abdominals and right multifidus  Additional Muscle Activation Details  Poor activation    Tests     Lumbar     Left   Negative crossed SLR, Hernandez and passive SLR  Right   Negative crossed SLR, Hernandez and passive SLR  Left Pelvic Girdle/Sacrum   Negative: sacrum compression, sacral spring and thigh thrust      Right Pelvic Girdle/Sacrum   Negative: sacrum compression, sacral spring and thigh thrust      Left Hip   Negative BENNIE, Nora MARMOLEJO's, scour, SI compression and SI distraction  SLR: Negative  Right Hip   Negative BENNIE, GONZALOIR, Gaenslen's, scour, SI compression and SI distraction  SLR: Negative       Ambulation Weight-Bearing Status   Weight-Bearing Status (Left): full weight bearing   Weight-Bearing Status (Right): full weight-bearing      Observational Gait   Gait: within functional limits     Functional Assessment   Squat   Left within functional limits and right within functional limits       Single Leg Stance   Left: 10 seconds  Right: 10 seconds    General Comments     Lumbar Comments  FIS - no effect  EIS - no effect  FABIANA - no effect  EIL - no effect

## 2018-05-31 ENCOUNTER — APPOINTMENT (OUTPATIENT)
Dept: LAB | Facility: HOSPITAL | Age: 53
End: 2018-05-31
Attending: INTERNAL MEDICINE
Payer: COMMERCIAL

## 2018-05-31 ENCOUNTER — OFFICE VISIT (OUTPATIENT)
Dept: INTERNAL MEDICINE CLINIC | Facility: CLINIC | Age: 53
End: 2018-05-31
Payer: COMMERCIAL

## 2018-05-31 VITALS
HEART RATE: 89 BPM | DIASTOLIC BLOOD PRESSURE: 80 MMHG | OXYGEN SATURATION: 95 % | BODY MASS INDEX: 37.38 KG/M2 | RESPIRATION RATE: 18 BRPM | HEIGHT: 72 IN | TEMPERATURE: 99 F | WEIGHT: 276 LBS | SYSTOLIC BLOOD PRESSURE: 140 MMHG

## 2018-05-31 DIAGNOSIS — J02.9 SORE THROAT: ICD-10-CM

## 2018-05-31 DIAGNOSIS — M10.9 GOUT, UNSPECIFIED CAUSE, UNSPECIFIED CHRONICITY, UNSPECIFIED SITE: ICD-10-CM

## 2018-05-31 DIAGNOSIS — E87.6 HYPOKALEMIA: ICD-10-CM

## 2018-05-31 DIAGNOSIS — E78.5 HYPERLIPIDEMIA, UNSPECIFIED HYPERLIPIDEMIA TYPE: ICD-10-CM

## 2018-05-31 DIAGNOSIS — J02.9 SORE THROAT: Primary | ICD-10-CM

## 2018-05-31 DIAGNOSIS — M54.16 LUMBAR RADICULOPATHY: ICD-10-CM

## 2018-05-31 DIAGNOSIS — D75.1 POLYCYTHEMIA: ICD-10-CM

## 2018-05-31 LAB
ALBUMIN SERPL BCP-MCNC: 3.8 G/DL (ref 3.5–5)
ALP SERPL-CCNC: 73 U/L (ref 46–116)
ALT SERPL W P-5'-P-CCNC: 70 U/L (ref 12–78)
ANION GAP SERPL CALCULATED.3IONS-SCNC: 10 MMOL/L (ref 4–13)
AST SERPL W P-5'-P-CCNC: 32 U/L (ref 5–45)
BASOPHILS # BLD AUTO: 0.05 THOUSANDS/ΜL (ref 0–0.1)
BASOPHILS NFR BLD AUTO: 1 % (ref 0–1)
BILIRUB SERPL-MCNC: 0.5 MG/DL (ref 0.2–1)
BUN SERPL-MCNC: 13 MG/DL (ref 5–25)
CALCIUM SERPL-MCNC: 8.9 MG/DL (ref 8.3–10.1)
CHLORIDE SERPL-SCNC: 106 MMOL/L (ref 100–108)
CO2 SERPL-SCNC: 26 MMOL/L (ref 21–32)
CREAT SERPL-MCNC: 1.01 MG/DL (ref 0.6–1.3)
EOSINOPHIL # BLD AUTO: 0.16 THOUSAND/ΜL (ref 0–0.61)
EOSINOPHIL NFR BLD AUTO: 2 % (ref 0–6)
ERYTHROCYTE [DISTWIDTH] IN BLOOD BY AUTOMATED COUNT: 12.9 % (ref 11.6–15.1)
ERYTHROCYTE [SEDIMENTATION RATE] IN BLOOD: 1 MM/HOUR (ref 0–10)
GFR SERPL CREATININE-BSD FRML MDRD: 84 ML/MIN/1.73SQ M
GLUCOSE P FAST SERPL-MCNC: 118 MG/DL (ref 65–99)
HCT VFR BLD AUTO: 50.8 % (ref 36.5–49.3)
HGB BLD-MCNC: 16.9 G/DL (ref 12–17)
IMM GRANULOCYTES # BLD AUTO: 0.03 THOUSAND/UL (ref 0–0.2)
IMM GRANULOCYTES NFR BLD AUTO: 0 % (ref 0–2)
LDLC SERPL DIRECT ASSAY-MCNC: 110 MG/DL (ref 0–100)
LYMPHOCYTES # BLD AUTO: 2.08 THOUSANDS/ΜL (ref 0.6–4.47)
LYMPHOCYTES NFR BLD AUTO: 26 % (ref 14–44)
MCH RBC QN AUTO: 32 PG (ref 26.8–34.3)
MCHC RBC AUTO-ENTMCNC: 33.3 G/DL (ref 31.4–37.4)
MCV RBC AUTO: 96 FL (ref 82–98)
MONOCYTES # BLD AUTO: 0.55 THOUSAND/ΜL (ref 0.17–1.22)
MONOCYTES NFR BLD AUTO: 7 % (ref 4–12)
NEUTROPHILS # BLD AUTO: 5.28 THOUSANDS/ΜL (ref 1.85–7.62)
NEUTS SEG NFR BLD AUTO: 64 % (ref 43–75)
NRBC BLD AUTO-RTO: 0 /100 WBCS
PLATELET # BLD AUTO: 200 THOUSANDS/UL (ref 149–390)
PMV BLD AUTO: 10.8 FL (ref 8.9–12.7)
POTASSIUM SERPL-SCNC: 3.9 MMOL/L (ref 3.5–5.3)
PROT SERPL-MCNC: 7.7 G/DL (ref 6.4–8.2)
RBC # BLD AUTO: 5.28 MILLION/UL (ref 3.88–5.62)
SODIUM SERPL-SCNC: 142 MMOL/L (ref 136–145)
T3FREE SERPL-MCNC: 3.06 PG/ML (ref 2.3–4.2)
T4 FREE SERPL-MCNC: 0.86 NG/DL (ref 0.76–1.46)
TRIGL SERPL-MCNC: 96 MG/DL
TSH SERPL DL<=0.05 MIU/L-ACNC: 2.48 UIU/ML (ref 0.36–3.74)
URATE SERPL-MCNC: 7.3 MG/DL (ref 4.2–8)
WBC # BLD AUTO: 8.15 THOUSAND/UL (ref 4.31–10.16)

## 2018-05-31 PROCEDURE — 86663 EPSTEIN-BARR ANTIBODY: CPT

## 2018-05-31 PROCEDURE — 80053 COMPREHEN METABOLIC PANEL: CPT

## 2018-05-31 PROCEDURE — 84439 ASSAY OF FREE THYROXINE: CPT

## 2018-05-31 PROCEDURE — 85025 COMPLETE CBC W/AUTO DIFF WBC: CPT

## 2018-05-31 PROCEDURE — 84481 FREE ASSAY (FT-3): CPT

## 2018-05-31 PROCEDURE — 84445 ASSAY OF TSI GLOBULIN: CPT

## 2018-05-31 PROCEDURE — 81270 JAK2 GENE: CPT

## 2018-05-31 PROCEDURE — 36415 COLL VENOUS BLD VENIPUNCTURE: CPT

## 2018-05-31 PROCEDURE — 86376 MICROSOMAL ANTIBODY EACH: CPT

## 2018-05-31 PROCEDURE — 86665 EPSTEIN-BARR CAPSID VCA: CPT

## 2018-05-31 PROCEDURE — 86664 EPSTEIN-BARR NUCLEAR ANTIGEN: CPT

## 2018-05-31 PROCEDURE — 86618 LYME DISEASE ANTIBODY: CPT

## 2018-05-31 PROCEDURE — 83721 ASSAY OF BLOOD LIPOPROTEIN: CPT

## 2018-05-31 PROCEDURE — 84550 ASSAY OF BLOOD/URIC ACID: CPT

## 2018-05-31 PROCEDURE — 84443 ASSAY THYROID STIM HORMONE: CPT

## 2018-05-31 PROCEDURE — 85652 RBC SED RATE AUTOMATED: CPT

## 2018-05-31 PROCEDURE — 99214 OFFICE O/P EST MOD 30 MIN: CPT | Performed by: INTERNAL MEDICINE

## 2018-05-31 PROCEDURE — 82668 ASSAY OF ERYTHROPOIETIN: CPT

## 2018-05-31 PROCEDURE — 84478 ASSAY OF TRIGLYCERIDES: CPT

## 2018-05-31 RX ORDER — AMOXICILLIN 500 MG/1
500 CAPSULE ORAL EVERY 8 HOURS SCHEDULED
Qty: 30 CAPSULE | Refills: 0 | Status: SHIPPED | OUTPATIENT
Start: 2018-05-31 | End: 2018-06-10

## 2018-05-31 NOTE — PROGRESS NOTES
Assessment/Plan:    1  Polycythemia will refer to Hematology for further evaluation and treatment will check laboratory work now  2  Sore throat will Rx amoxicillin rule out Robert Bar virus  3  Hypertension is near goal will review medications recheck in 2 months  4  Elevated TSH will recheck now may consider treatment  5  Chronic back pain lumbar radiculopathy will refer to Pain Management           Diagnoses and all orders for this visit:    Sore throat  -     EBV acute panel; Future  -     Anti-microsomal antibody; Future  -     Sedimentation rate, automated; Future  -     T3, free; Future  -     T4, free; Future  -     Cancel: Thyroid stimulating immunoglobulin; Future  -     CBC and differential; Future  -     Comprehensive metabolic panel; Future  -     amoxicillin (AMOXIL) 500 mg capsule; Take 1 capsule (500 mg total) by mouth every 8 (eight) hours for 10 days  -     LDL cholesterol, direct; Future  -     Triglycerides; Future  -     Lyme Antibody Profile with reflex to WB; Future  -     TSH, 3rd generation; Future    Gout, unspecified cause, unspecified chronicity, unspecified site  -     EBV acute panel; Future  -     Anti-microsomal antibody; Future  -     Sedimentation rate, automated; Future  -     T3, free; Future  -     T4, free; Future  -     Cancel: Thyroid stimulating immunoglobulin; Future  -     CBC and differential; Future  -     Comprehensive metabolic panel; Future  -     Uric acid; Future  -     LDL cholesterol, direct; Future  -     Triglycerides; Future  -     Lyme Antibody Profile with reflex to WB; Future  -     TSH, 3rd generation; Future    Lumbar radiculopathy  -     EBV acute panel; Future  -     Ambulatory referral to Pain Management; Future  -     Anti-microsomal antibody; Future  -     Sedimentation rate, automated; Future  -     T3, free; Future  -     T4, free; Future  -     Cancel: Thyroid stimulating immunoglobulin;  Future  -     CBC and differential; Future  - Comprehensive metabolic panel; Future  -     LDL cholesterol, direct; Future  -     Triglycerides; Future  -     Lyme Antibody Profile with reflex to WB; Future  -     TSH, 3rd generation; Future    Hypokalemia  -     EBV acute panel; Future  -     Anti-microsomal antibody; Future  -     Sedimentation rate, automated; Future  -     T3, free; Future  -     T4, free; Future  -     Cancel: Thyroid stimulating immunoglobulin; Future  -     CBC and differential; Future  -     Comprehensive metabolic panel; Future  -     LDL cholesterol, direct; Future  -     Triglycerides; Future  -     Lyme Antibody Profile with reflex to WB; Future  -     TSH, 3rd generation; Future    Hyperlipidemia, unspecified hyperlipidemia type  -     EBV acute panel; Future  -     Anti-microsomal antibody; Future  -     Sedimentation rate, automated; Future  -     T3, free; Future  -     T4, free; Future  -     Cancel: Thyroid stimulating immunoglobulin; Future  -     CBC and differential; Future  -     Comprehensive metabolic panel; Future  -     LDL cholesterol, direct; Future  -     Triglycerides; Future  -     Lyme Antibody Profile with reflex to WB; Future  -     TSH, 3rd generation; Future    Polycythemia  -     EBV acute panel; Future  -     Ambulatory referral to Hematology / Oncology; Future  -     Anti-microsomal antibody; Future  -     Sedimentation rate, automated; Future  -     T3, free; Future  -     T4, free; Future  -     Cancel: Thyroid stimulating immunoglobulin; Future  -     CBC and differential; Future  -     Comprehensive metabolic panel; Future  -     LDL cholesterol, direct; Future  -     Triglycerides; Future  -     Lyme Antibody Profile with reflex to WB; Future  -     TSH, 3rd generation; Future  -     Erythropoietin; Future  -     BILLY 2; Future         Scheduled Meds:  Continuous Infusions:  No current facility-administered medications for this visit     PRN Meds:   Scheduled Meds:  Continuous Infusions:  No current facility-administered medications for this visit  Scheduled Meds:    Current Outpatient Prescriptions:     amoxicillin (AMOXIL) 500 mg capsule, Take 1 capsule (500 mg total) by mouth every 8 (eight) hours for 10 days, Disp: 30 capsule, Rfl: 0    atorvastatin (LIPITOR) 10 mg tablet, Take 2 tablets (20 mg total) by mouth daily, Disp: 30 tablet, Rfl: 5      The patient was counseled regarding instructions for management, risk factor reductions, patient and family education,impressions, risks and benefits of treatment options, side effects of medications, importance of compliance with treatment  The treatment plan was reviewed with the patient/guardian and patient/guardian understands and agrees with the treatment plan  Subjective:      Patient ID: Araceli Clifford is a 45 y o  male  Monday sore throat sweats, fatigue chronic back pain        The following portions of the patient's history were reviewed and updated as appropriate:   He has a past medical history of Hypertension  ,   does not have any pertinent problems on file  ,   has no past surgical history on file  ,  family history includes Cancer in his mother; Coronary artery disease in his father; Diabetes in his family; Heart disease in his father  ,   reports that he has never smoked  He has never used smokeless tobacco  He reports that he drinks alcohol  He reports that he does not use drugs  ,  has No Known Allergies       Review of Systems   Constitutional: Positive for fatigue and fever  Negative for appetite change, chills and unexpected weight change  HENT: Positive for sore throat  Negative for congestion, ear pain, facial swelling, hearing loss, mouth sores, nosebleeds, postnasal drip, rhinorrhea, sinus pain, trouble swallowing and voice change  Eyes: Negative for pain, discharge, redness and visual disturbance  Respiratory: Negative for apnea, chest tightness, shortness of breath, wheezing and stridor      Cardiovascular: Negative for chest pain, palpitations and leg swelling  Gastrointestinal: Negative for abdominal distention, abdominal pain, blood in stool, constipation, diarrhea and vomiting  Endocrine: Negative for cold intolerance, heat intolerance, polydipsia, polyphagia and polyuria  Genitourinary: Negative for difficulty urinating, dysuria, flank pain, frequency, genital sores, hematuria and urgency  Musculoskeletal: Positive for back pain  Negative for arthralgias  Skin: Negative for rash and wound  Allergic/Immunologic: Negative for environmental allergies, food allergies and immunocompromised state  Neurological: Negative for dizziness, tremors, seizures, syncope, facial asymmetry, speech difficulty, weakness, light-headedness, numbness and headaches  Hematological: Negative for adenopathy  Does not bruise/bleed easily  Psychiatric/Behavioral: Negative for agitation, behavioral problems, dysphoric mood, hallucinations, self-injury, sleep disturbance and suicidal ideas  The patient is not hyperactive  Objective:     Physical Exam   Constitutional: He is oriented to person, place, and time  He appears well-developed  HENT:   Right Ear: Tympanic membrane, external ear and ear canal normal    Left Ear: Tympanic membrane, external ear and ear canal normal    Mouth/Throat: Uvula is midline  Posterior oropharyngeal edema and posterior oropharyngeal erythema present  No oropharyngeal exudate or tonsillar abscesses  Eyes: Right eye exhibits no discharge  Left eye exhibits no discharge  No scleral icterus  Neck: Carotid bruit is not present  No tracheal deviation present  No thyroid mass and no thyromegaly present  Cardiovascular: Normal rate, regular rhythm, normal heart sounds and intact distal pulses  Exam reveals no gallop and no friction rub  No murmur heard  Pulmonary/Chest: No respiratory distress  He has no wheezes  He has no rales  Musculoskeletal: He exhibits no edema     Lymphadenopathy: He has no cervical adenopathy  Neurological: He is alert and oriented to person, place, and time  Coordination normal    Psychiatric: He has a normal mood and affect  His behavior is normal  Judgment and thought content normal    Nursing note and vitals reviewed        Vitals:    05/31/18 0952 05/31/18 1023   BP:  140/80   BP Location:  Left arm   Patient Position:  Sitting   Pulse: 89    Resp: 18    Temp: 99 °F (37 2 °C)    TempSrc: Tympanic    SpO2: 95%    Weight: 125 kg (276 lb)    Height: 6' (1 829 m)

## 2018-06-01 LAB
B BURGDOR IGG SER IA-ACNC: 0.06
B BURGDOR IGM SER IA-ACNC: 0.17
EBV EA IGG SER-ACNC: <9 U/ML (ref 0–8.9)
EBV NA IGG SER IA-ACNC: 547 U/ML (ref 0–17.9)
EBV PATRN SPEC IB-IMP: ABNORMAL
EBV VCA IGG SER IA-ACNC: 102 U/ML (ref 0–17.9)
EBV VCA IGM SER IA-ACNC: <36 U/ML (ref 0–35.9)
EPO SERPL-ACNC: 4.5 MIU/ML (ref 2.6–18.5)
THYROPEROXIDASE AB SERPL-ACNC: 17 IU/ML (ref 0–34)

## 2018-06-02 LAB — TSI SER-ACNC: <0.1 IU/L (ref 0–0.55)

## 2018-06-04 DIAGNOSIS — M54.50 LUMBAR PAIN: Primary | ICD-10-CM

## 2018-06-04 RX ORDER — METHOCARBAMOL 500 MG/1
500 TABLET, FILM COATED ORAL 2 TIMES DAILY
Qty: 60 TABLET | Refills: 0
Start: 2018-06-04 | End: 2019-03-18 | Stop reason: ALTCHOICE

## 2018-06-05 LAB — SCAN RESULT: NORMAL

## 2018-06-06 ENCOUNTER — TELEPHONE (OUTPATIENT)
Dept: PAIN MEDICINE | Facility: CLINIC | Age: 53
End: 2018-06-06

## 2018-06-26 ENCOUNTER — OFFICE VISIT (OUTPATIENT)
Dept: INTERNAL MEDICINE CLINIC | Facility: CLINIC | Age: 53
End: 2018-06-26
Payer: COMMERCIAL

## 2018-06-26 ENCOUNTER — TELEPHONE (OUTPATIENT)
Dept: INTERNAL MEDICINE CLINIC | Facility: CLINIC | Age: 53
End: 2018-06-26

## 2018-06-26 VITALS
DIASTOLIC BLOOD PRESSURE: 100 MMHG | BODY MASS INDEX: 36.98 KG/M2 | TEMPERATURE: 98.5 F | RESPIRATION RATE: 17 BRPM | HEIGHT: 72 IN | WEIGHT: 273 LBS | OXYGEN SATURATION: 96 % | HEART RATE: 78 BPM | SYSTOLIC BLOOD PRESSURE: 198 MMHG

## 2018-06-26 DIAGNOSIS — I10 HYPERTENSION, UNSPECIFIED TYPE: Primary | ICD-10-CM

## 2018-06-26 DIAGNOSIS — M10.9 GOUT, UNSPECIFIED CAUSE, UNSPECIFIED CHRONICITY, UNSPECIFIED SITE: ICD-10-CM

## 2018-06-26 PROCEDURE — 99214 OFFICE O/P EST MOD 30 MIN: CPT | Performed by: INTERNAL MEDICINE

## 2018-06-26 RX ORDER — LISINOPRIL 40 MG/1
40 TABLET ORAL DAILY
Qty: 90 TABLET | Refills: 2 | Status: SHIPPED | OUTPATIENT
Start: 2018-06-26 | End: 2019-04-26 | Stop reason: SDUPTHER

## 2018-06-26 RX ORDER — METOPROLOL SUCCINATE 100 MG/1
100 TABLET, EXTENDED RELEASE ORAL DAILY
Qty: 90 TABLET | Refills: 2 | Status: SHIPPED | OUTPATIENT
Start: 2018-06-26 | End: 2019-04-26 | Stop reason: SDUPTHER

## 2018-06-26 RX ORDER — AMLODIPINE BESYLATE 5 MG/1
5 TABLET ORAL DAILY
Qty: 90 TABLET | Refills: 2 | Status: SHIPPED | OUTPATIENT
Start: 2018-06-26 | End: 2018-07-10 | Stop reason: SDUPTHER

## 2018-06-26 NOTE — TELEPHONE ENCOUNTER
Dr Lulú Gabriel (occupational medicine) would like to speak with Dr Anthony Cruz about pt's high blood pressure    Office direct number# [phone]

## 2018-06-26 NOTE — PATIENT INSTRUCTIONS
DASH Eating Plan   WHAT YOU NEED TO KNOW:   The DASH (Dietary Approaches to Stop Hypertension) Eating Plan is designed to help prevent or lower high blood pressure  It can also help to lower LDL (bad) cholesterol and decrease your risk of heart disease  The plan is low in sodium, sugar, unhealthy fats, and total fat  It is high in potassium, calcium, magnesium, and fiber  These nutrients are added when you eat more fruits, vegetables, and whole grains  DISCHARGE INSTRUCTIONS:   Your sodium limit each day: Your dietitian will tell you how much sodium is safe for you to have each day  People with high blood pressure should have no more than 1,500 to 2,300 mg of sodium in a day  A teaspoon (tsp) of salt has 2,300 mg of sodium  This may seem like a difficult goal, but small changes to the foods you eat can make a big difference  Your healthcare provider or dietitian can help you create a meal plan that follows your sodium limit  How to limit sodium:   · Read food labels  Food labels can help you choose foods that are low in sodium  The amount of sodium is listed in milligrams (mg)  The % Daily Value (DV) column tells you how much of your daily needs are met by 1 serving of the food for each nutrient listed  Choose foods that have less than 5% of the DV of sodium  These foods are considered low in sodium  Foods that have 20% or more of the DV of sodium are considered high in sodium  Avoid foods that have more than 300 mg of sodium in each serving  Choose foods that say low-sodium, reduced-sodium, or no salt added on the food label  · Avoid salt  Do not salt food at the table, and add very little salt to foods during cooking  Use herbs and spices, such as onions, garlic, and salt-free seasonings to add flavor to foods  Try lemon or lime juice or vinegar to give foods a tart flavor  Use hot peppers or a small amount of hot pepper sauce to add a spicy flavor to foods  · Ask about salt substitutes    Ask your healthcare provider if you may use salt substitutes  Some salt substitutes have ingredients that can be harmful if you have certain health conditions  · Choose foods carefully at restaurants  Meals from restaurants, especially fast food restaurants, are often high in sodium  Some restaurants have nutrition information that tells you the amount of sodium in their foods  Ask to have your food prepared with less, or no salt  What you need to know about fats:   · Include healthy fats  Examples are unsaturated fats and omega-3 fatty acids  Unsaturated fats are found in soybean, canola, olive, or sunflower oil, and liquid and soft tub margarines  Omega-3 fatty acids are found in fatty fish, such as salmon, tuna, mackerel, and sardines  It is also found in flaxseed oil and ground flaxseed  · Avoid unhealthy fats  Do not eat unhealthy fats, such as saturated fats and trans fats  Saturated fats are found in foods that contain fat from animals  Examples are fatty meats, whole milk, butter, cream, and other dairy foods  It is also found in shortening, stick margarine, palm oil, and coconut oil  Trans fats are found in fried foods, crackers, chips, and baked goods made with margarine or shortening  Foods to include: With the DASH eating plan, you need to eat a certain number of servings from each food group  This will help you get enough of certain nutrients and limit others  The amount of servings you should eat depends on how many calories you need  Your dietitian can tell you how many calories you need  The number of servings listed next to the food groups below are for people who need about 2,000 calories each day    · Grains:  6 to 8 servings (3 of these servings should be whole-grain foods)    ¨ 1 slice of whole-grain bread     ¨ 1 ounce of dry cereal    ¨ ½ cup of cooked cereal, pasta, or brown rice    · Vegetables and fruits:  4 to 5 servings of fruits and 4 to 5 servings of vegetables    ¨ 1 medium fruit    ¨ ½ cup of frozen, canned (no added salt), or chopped fresh vegetables     ¨ ½ cup of fresh, frozen, dried, or canned fruit (canned in light syrup or fruit juice)    ¨ ½ cup of vegetable or fruit juice    · Dairy:  2 to 3 servings    ¨ 1 cup of nonfat (skim) or 1% milk    ¨ 1½ ounces of fat-free or low-fat cheese    ¨ 6 ounces of nonfat or low-fat yogurt    · Lean meat, poultry, and fish:  6 ounces or less    [employer] (chicken, turkey) with no skin    ¨ Fish (especially fatty fish, such as salmon, fresh tuna, or mackerel)    ¨ Lean beef and pork (loin, round, extra lean hamburger)    ¨ Egg whites and egg substitutes    · Nuts, seeds, and legumes:  4 to 5 servings each week    ¨ ½ cup of cooked beans and peas    ¨ 1½ ounces of unsalted nuts    ¨ 2 tablespoons of peanut butter or seeds    · Sweets and added sugars:  5 or less each week    ¨ 1 tablespoon of sugar, jelly, or jam    ¨ ½ cup of sorbet or gelatin    ¨ 1 cup of lemonade    · Fats:  2 to 3 servings each week    ¨ 1 teaspoon of soft margarine or vegetable oil    ¨ 1 tablespoon of mayonnaise    ¨ 2 tablespoons of salad dressing  Foods to avoid:   · Grains:      [employer], such as doughnuts, pastries, cookies, and biscuits (high in fat and sugar)    ¨ Mixes for cornbread and biscuits, packaged foods, such as bread stuffing, rice and pasta mixes, macaroni and cheese, and instant cereals (high in sodium)    · Fruits and vegetables:      ¨ Regular, canned vegetables (high in sodium)    ¨ Sauerkraut, pickled vegetables, and other foods prepared in brine (high in sodium)    ¨ Fried vegetables or vegetables in butter or high-fat sauces    ¨ Fruit in cream or butter sauce (high in fat)    · Dairy:      ¨ Whole milk, 2% milk, and cream (high in fat)    ¨ Regular cheese and processed cheese (high in fat and sodium)    · Meats and protein foods:      ¨ Smoked or cured meat, such as corned beef, mistry, ham, hot dogs, and sausage (high in fat and sodium)    ¨ Canned beans and canned meats or spreads, such as potted meats, sardines, anchovies, and imitation seafood (high in sodium)    ¨ Deli or lunch meats, such as bologna, ham, turkey, and roast beef (high in sodium)    ¨ High-fat meat (T-bone steak, regular hamburger, and ribs)    ¨ Whole eggs and egg yolks (high in fat)    · Other:      ¨ Seasonings made with salt, such as garlic salt, celery salt, onion salt, seasoned salt, meat tenderizers, and monosodium glutamate (MSG)    ¨ Miso soup and canned or dried soup mixes (high in sodium)    ¨ Regular soy sauce, barbecue sauce, teriyaki sauce, steak sauce, Worcestershire sauce, and most flavored vinegars (high in sodium)    ¨ Regular condiments, such as mustard, ketchup, and salad dressings (high in sodium)    ¨ Gravy and sauces, such as Liborio or cheese sauces (high in sodium and fat)    ¨ Drinks high in sugar, such as soda or fruit drinks    [employer] foods, such as salted chips, popcorn, pretzels, pork rinds, salted crackers, and salted nuts    ¨ Frozen foods, such as dinners, entrees, vegetables with sauces, and breaded meats (high in sodium)  Other guidelines to follow:   · Maintain a healthy weight  Your risk for heart disease is higher if you are overweight  Your healthcare provider may suggest that you lose weight if you are overweight  You can lose weight by eating fewer calories and foods that have added sugars and fat  The DASH meal plan can help you do this  Decrease calories by eating smaller portions at each meal and fewer snacks  Ask your healthcare provider for more information about how to lose weight  · Exercise regularly  Regular exercise can help you reach or maintain a healthy weight  Regular exercise can also help decrease your blood pressure and improve your cholesterol levels  Get 30 minutes or more of moderate exercise each day of the week  To lose weight, get at least 60 minutes of exercise   Talk to your healthcare provider about the best exercise program for you  · Limit alcohol  Women should limit alcohol to 1 drink a day  Men should limit alcohol to 2 drinks a day  A drink of alcohol is 12 ounces of beer, 5 ounces of wine, or 1½ ounces of liquor  © 2017 [facility] Information is for End User's use only and may not be sold, redistributed or otherwise used for commercial purposes  All illustrations and images included in CareNotes® are the copyrighted property of [employer]  or [address]  The above information is an  only  It is not intended as medical advice for individual conditions or treatments  Talk to your doctor, nurse or pharmacist before following any medical regimen to see if it is safe and effective for you

## 2018-06-26 NOTE — PROGRESS NOTES
Assessment/Plan:    1  Hypertension had stopped all of his medications needed only to stop the chlorthalidone for gout he will be restarting his blood pressure medicine in addition to amlodipine of the seeing him back in 2 weeks will get a blood pressure check tomorrow as well as on Tuesday blood pressure goal is less than 130/80         Diagnoses and all orders for this visit:    Hypertension, unspecified type  -     lisinopril (ZESTRIL) 40 mg tablet; Take 1 tablet (40 mg total) by mouth daily  -     metoprolol succinate (TOPROL-XL) 100 mg 24 hr tablet; Take 1 tablet (100 mg total) by mouth daily  -     amLODIPine (NORVASC) 5 mg tablet; Take 1 tablet (5 mg total) by mouth daily    Gout, unspecified cause, unspecified chronicity, unspecified site         Scheduled Meds:  Continuous Infusions:  No current facility-administered medications for this visit  PRN Meds:   Scheduled Meds:  Continuous Infusions:  No current facility-administered medications for this visit  Scheduled Meds:    Current Outpatient Prescriptions:     amLODIPine (NORVASC) 5 mg tablet, Take 1 tablet (5 mg total) by mouth daily, Disp: 90 tablet, Rfl: 2    atorvastatin (LIPITOR) 10 mg tablet, Take 2 tablets (20 mg total) by mouth daily, Disp: 30 tablet, Rfl: 5    lisinopril (ZESTRIL) 40 mg tablet, Take 1 tablet (40 mg total) by mouth daily, Disp: 90 tablet, Rfl: 2    methocarbamol (ROBAXIN) 500 mg tablet, Take 1 tablet (500 mg total) by mouth 2 (two) times a day, Disp: 60 tablet, Rfl: 0    metoprolol succinate (TOPROL-XL) 100 mg 24 hr tablet, Take 1 tablet (100 mg total) by mouth daily, Disp: 90 tablet, Rfl: 2      The patient was counseled regarding instructions for management, risk factor reductions, patient and family education,impressions, risks and benefits of treatment options, side effects of medications, importance of compliance with treatment   The treatment plan was reviewed with the patient/guardian and patient/guardian understands and agrees with the treatment plan  Subjective:      Patient ID: Martell Chow is a 45 y o  male  Has stopped all of his blood pressure medication he went for [facility] physical blood pressure was markedly elevated was seen today denies any headache chest pain shortness of breath        The following portions of the patient's history were reviewed and updated as appropriate:   He has a past medical history of Hypertension  ,   does not have any pertinent problems on file  ,   has no past surgical history on file  ,  family history includes Cancer in his mother; Coronary artery disease in his father; Diabetes in his family; Heart disease in his father  ,   reports that he has never smoked  He has never used smokeless tobacco  He reports that he drinks alcohol  He reports that he does not use drugs  ,  has No Known Allergies       Review of Systems   Constitutional: Negative for appetite change, chills, fatigue, fever and unexpected weight change  HENT: Negative for congestion, ear pain, facial swelling, hearing loss, mouth sores, nosebleeds, postnasal drip, rhinorrhea, sinus pain, sore throat, trouble swallowing and voice change  Eyes: Negative for pain, discharge, redness and visual disturbance  Respiratory: Negative for apnea, chest tightness, shortness of breath, wheezing and stridor  Cardiovascular: Negative for chest pain, palpitations and leg swelling  Gastrointestinal: Negative for abdominal distention, abdominal pain, blood in stool, constipation, diarrhea and vomiting  Endocrine: Negative for cold intolerance, heat intolerance, polydipsia, polyphagia and polyuria  Genitourinary: Negative for difficulty urinating, dysuria, flank pain, frequency, genital sores, hematuria and urgency  Musculoskeletal: Negative for arthralgias and back pain  Skin: Negative for rash and wound     Allergic/Immunologic: Negative for environmental allergies, food allergies and immunocompromised state    Neurological: Negative for dizziness, tremors, seizures, syncope, facial asymmetry, speech difficulty, weakness, light-headedness, numbness and headaches  Hematological: Negative for adenopathy  Does not bruise/bleed easily  Psychiatric/Behavioral: Negative for agitation, behavioral problems, dysphoric mood, hallucinations, self-injury, sleep disturbance and suicidal ideas  The patient is not hyperactive  Objective:     Physical Exam   Constitutional: He is oriented to person, place, and time  He appears well-developed  HENT:   Right Ear: External ear normal    Left Ear: External ear normal    Eyes: Right eye exhibits no discharge  Left eye exhibits no discharge  No scleral icterus  Neck: Carotid bruit is not present  No tracheal deviation present  No thyroid mass and no thyromegaly present  Cardiovascular: Normal rate, regular rhythm, normal heart sounds and intact distal pulses  Exam reveals no gallop and no friction rub  No murmur heard  Pulmonary/Chest: No respiratory distress  He has no wheezes  He has no rales  Musculoskeletal: He exhibits no edema  Lymphadenopathy:     He has no cervical adenopathy  Neurological: He is alert and oriented to person, place, and time  Coordination normal    Psychiatric: He has a normal mood and affect  His behavior is normal  Judgment and thought content normal    Nursing note and vitals reviewed        Vitals:    06/26/18 1318 06/26/18 1332   BP:  (!) 198/100   BP Location:  Left arm   Patient Position:  Sitting   Pulse: 78    Resp: 17    Temp: 98 5 °F (36 9 °C)    SpO2: 96%    Weight: 124 kg (273 lb)    Height: 6' (1 829 m)

## 2018-06-27 ENCOUNTER — CLINICAL SUPPORT (OUTPATIENT)
Dept: INTERNAL MEDICINE CLINIC | Facility: CLINIC | Age: 53
End: 2018-06-27

## 2018-06-27 VITALS — DIASTOLIC BLOOD PRESSURE: 88 MMHG | SYSTOLIC BLOOD PRESSURE: 140 MMHG

## 2018-06-27 DIAGNOSIS — I10 ESSENTIAL HYPERTENSION: Primary | ICD-10-CM

## 2018-06-27 NOTE — PROGRESS NOTES
Patient presents for BP reading, pls review 3 readings with Large cuff also patient asked about the Methocarbanol does he needs to continue on it?       LEFT ARM   140/90    RIGHT /90    LEFT ARM   140/88

## 2018-06-28 ENCOUNTER — CLINICAL SUPPORT (OUTPATIENT)
Dept: INTERNAL MEDICINE CLINIC | Facility: CLINIC | Age: 53
End: 2018-06-28

## 2018-06-28 VITALS — DIASTOLIC BLOOD PRESSURE: 92 MMHG | SYSTOLIC BLOOD PRESSURE: 154 MMHG

## 2018-06-28 DIAGNOSIS — I10 PRIMARY HYPERTENSION: Primary | ICD-10-CM

## 2018-07-05 ENCOUNTER — CLINICAL SUPPORT (OUTPATIENT)
Dept: INTERNAL MEDICINE CLINIC | Facility: CLINIC | Age: 53
End: 2018-07-05

## 2018-07-05 VITALS — SYSTOLIC BLOOD PRESSURE: 140 MMHG | DIASTOLIC BLOOD PRESSURE: 80 MMHG

## 2018-07-05 DIAGNOSIS — I10 ESSENTIAL HYPERTENSION: Primary | ICD-10-CM

## 2018-07-10 ENCOUNTER — OFFICE VISIT (OUTPATIENT)
Dept: INTERNAL MEDICINE CLINIC | Facility: CLINIC | Age: 53
End: 2018-07-10
Payer: COMMERCIAL

## 2018-07-10 VITALS
WEIGHT: 270.02 LBS | BODY MASS INDEX: 36.57 KG/M2 | SYSTOLIC BLOOD PRESSURE: 140 MMHG | HEART RATE: 78 BPM | RESPIRATION RATE: 18 BRPM | OXYGEN SATURATION: 96 % | HEIGHT: 72 IN | TEMPERATURE: 98.6 F | DIASTOLIC BLOOD PRESSURE: 82 MMHG

## 2018-07-10 DIAGNOSIS — M10.9 GOUT, UNSPECIFIED CAUSE, UNSPECIFIED CHRONICITY, UNSPECIFIED SITE: Primary | ICD-10-CM

## 2018-07-10 DIAGNOSIS — R74.01 ELEVATED ALT MEASUREMENT: ICD-10-CM

## 2018-07-10 DIAGNOSIS — E78.5 HYPERLIPIDEMIA, UNSPECIFIED HYPERLIPIDEMIA TYPE: ICD-10-CM

## 2018-07-10 DIAGNOSIS — I10 HYPERTENSION, UNSPECIFIED TYPE: ICD-10-CM

## 2018-07-10 DIAGNOSIS — I10 ESSENTIAL HYPERTENSION: ICD-10-CM

## 2018-07-10 PROBLEM — R59.9 ENLARGED LYMPH NODE: Status: RESOLVED | Noted: 2017-09-05 | Resolved: 2018-07-10

## 2018-07-10 PROCEDURE — 99214 OFFICE O/P EST MOD 30 MIN: CPT | Performed by: INTERNAL MEDICINE

## 2018-07-10 PROCEDURE — 3008F BODY MASS INDEX DOCD: CPT | Performed by: INTERNAL MEDICINE

## 2018-07-10 PROCEDURE — 1036F TOBACCO NON-USER: CPT | Performed by: INTERNAL MEDICINE

## 2018-07-10 RX ORDER — AMLODIPINE BESYLATE 10 MG/1
10 TABLET ORAL DAILY
Qty: 90 TABLET | Refills: 2 | Status: SHIPPED | OUTPATIENT
Start: 2018-07-10 | End: 2019-04-26 | Stop reason: SDUPTHER

## 2018-07-10 NOTE — PROGRESS NOTES
Assessment/Plan:    1  Hypertension is not at goal will increase amlodipine to 10 mg a day will recheck in 2 months  2  Fatty liver recent liver function tests are normal will need to recheck after his next visit  3  Hyperlipidemia continue atorvastatin will need to recheck after next visit  4  Patient with history of gout will hold off on diuretics chlorthalidone which was very effective in controlling his blood pressure check uric acid after his next visit in 2 months       Diagnoses and all orders for this visit:    Gout, unspecified cause, unspecified chronicity, unspecified site    Elevated ALT measurement    Essential hypertension    Hypertension, unspecified type  -     amLODIPine (NORVASC) 10 mg tablet; Take 1 tablet (10 mg total) by mouth daily    Hyperlipidemia, unspecified hyperlipidemia type         Scheduled Meds:  Continuous Infusions:  No current facility-administered medications for this visit  PRN Meds:   Scheduled Meds:  Continuous Infusions:  No current facility-administered medications for this visit  Scheduled Meds:    Current Outpatient Prescriptions:     amLODIPine (NORVASC) 10 mg tablet, Take 1 tablet (10 mg total) by mouth daily, Disp: 90 tablet, Rfl: 2    atorvastatin (LIPITOR) 10 mg tablet, Take 2 tablets (20 mg total) by mouth daily, Disp: 30 tablet, Rfl: 5    lisinopril (ZESTRIL) 40 mg tablet, Take 1 tablet (40 mg total) by mouth daily, Disp: 90 tablet, Rfl: 2    methocarbamol (ROBAXIN) 500 mg tablet, Take 1 tablet (500 mg total) by mouth 2 (two) times a day, Disp: 60 tablet, Rfl: 0    metoprolol succinate (TOPROL-XL) 100 mg 24 hr tablet, Take 1 tablet (100 mg total) by mouth daily, Disp: 90 tablet, Rfl: 2      The patient was counseled regarding instructions for management, risk factor reductions, patient and family education,impressions, risks and benefits of treatment options, side effects of medications, importance of compliance with treatment   The treatment plan was reviewed with the patient/guardian and patient/guardian understands and agrees with the treatment plan  Subjective:      Patient ID: Gregg Roberts is a 45 y o  male  Tolerating blood pressure medicine well        The following portions of the patient's history were reviewed and updated as appropriate:   He has a past medical history of Hypertension  ,   does not have any pertinent problems on file  ,   has no past surgical history on file  ,  family history includes Cancer in his mother; Coronary artery disease in his father; Diabetes in his family; Heart disease in his father  ,   reports that he has never smoked  He has never used smokeless tobacco  He reports that he drinks alcohol  He reports that he does not use drugs  ,  has No Known Allergies       Review of Systems   Constitutional: Negative for appetite change, chills, fatigue, fever and unexpected weight change  HENT: Negative for congestion, ear pain, facial swelling, hearing loss, mouth sores, nosebleeds, postnasal drip, rhinorrhea, sinus pain, sore throat, trouble swallowing and voice change  Eyes: Negative for pain, discharge, redness and visual disturbance  Respiratory: Negative for apnea, chest tightness, shortness of breath, wheezing and stridor  Cardiovascular: Negative for chest pain, palpitations and leg swelling  Gastrointestinal: Negative for abdominal distention, abdominal pain, blood in stool, constipation, diarrhea and vomiting  Endocrine: Negative for cold intolerance, heat intolerance, polydipsia, polyphagia and polyuria  Genitourinary: Negative for difficulty urinating, dysuria, flank pain, frequency, genital sores, hematuria and urgency  Musculoskeletal: Negative for arthralgias and back pain  Skin: Negative for rash and wound  Allergic/Immunologic: Negative for environmental allergies, food allergies and immunocompromised state     Neurological: Negative for dizziness, tremors, seizures, syncope, facial asymmetry, speech difficulty, weakness, light-headedness, numbness and headaches  Hematological: Negative for adenopathy  Does not bruise/bleed easily  Psychiatric/Behavioral: Negative for agitation, behavioral problems, dysphoric mood, hallucinations, self-injury, sleep disturbance and suicidal ideas  The patient is not hyperactive  Objective:     Physical Exam   Constitutional: He is oriented to person, place, and time  He appears well-developed  Obesity   HENT:   Right Ear: External ear normal    Left Ear: External ear normal    Eyes: Right eye exhibits no discharge  Left eye exhibits no discharge  No scleral icterus  Neck: Carotid bruit is not present  No tracheal deviation present  No thyroid mass and no thyromegaly present  Cardiovascular: Normal rate, regular rhythm, normal heart sounds and intact distal pulses  Exam reveals no gallop and no friction rub  No murmur heard  Pulmonary/Chest: No respiratory distress  He has no wheezes  He has no rales  Musculoskeletal: He exhibits no edema  Lymphadenopathy:     He has no cervical adenopathy  Neurological: He is alert and oriented to person, place, and time  Coordination normal    Psychiatric: He has a normal mood and affect  His behavior is normal  Judgment and thought content normal    Nursing note and vitals reviewed        Vitals:    07/10/18 0917 07/10/18 0948 07/10/18 0954   BP:  145/85 140/82   BP Location:  Left arm Left arm   Patient Position:  Sitting Sitting   Pulse: 78     Resp: 18     Temp: 98 6 °F (37 °C)     TempSrc: Tympanic     SpO2: 96%     Weight: 122 kg (270 lb 0 3 oz)     Height: 6' (1 829 m)

## 2018-07-10 NOTE — PROGRESS NOTES
Assessment/Plan:    ***         There are no diagnoses linked to this encounter  Scheduled Meds:  Continuous Infusions:  No current facility-administered medications for this visit  PRN Meds:   Scheduled Meds:  Continuous Infusions:  No current facility-administered medications for this visit  Scheduled Meds:    Current Outpatient Prescriptions:     amLODIPine (NORVASC) 5 mg tablet, Take 1 tablet (5 mg total) by mouth daily, Disp: 90 tablet, Rfl: 2    atorvastatin (LIPITOR) 10 mg tablet, Take 2 tablets (20 mg total) by mouth daily, Disp: 30 tablet, Rfl: 5    lisinopril (ZESTRIL) 40 mg tablet, Take 1 tablet (40 mg total) by mouth daily, Disp: 90 tablet, Rfl: 2    methocarbamol (ROBAXIN) 500 mg tablet, Take 1 tablet (500 mg total) by mouth 2 (two) times a day, Disp: 60 tablet, Rfl: 0    metoprolol succinate (TOPROL-XL) 100 mg 24 hr tablet, Take 1 tablet (100 mg total) by mouth daily, Disp: 90 tablet, Rfl: 2      The patient was counseled regarding instructions for management, risk factor reductions, patient and family education,impressions, risks and benefits of treatment options, side effects of medications, importance of compliance with treatment  The treatment plan was reviewed with the patient/guardian and patient/guardian understands and agrees with the treatment plan  Subjective:      Patient ID: Sumeet Gregory is a 45 y o  male  HPI    The following portions of the patient's history were reviewed and updated as appropriate:   He has a past medical history of Hypertension  ,   does not have any pertinent problems on file  ,   has no past surgical history on file  ,  family history includes Cancer in his mother; Coronary artery disease in his father; Diabetes in his family; Heart disease in his father  ,   reports that he has never smoked  He has never used smokeless tobacco  He reports that he drinks alcohol  He reports that he does not use drugs  ,  has No Known Allergies       Review of Systems   Constitutional: Negative for appetite change, chills, fatigue, fever and unexpected weight change  HENT: Negative for congestion, ear pain, facial swelling, hearing loss, mouth sores, nosebleeds, postnasal drip, rhinorrhea, sinus pain, sore throat, trouble swallowing and voice change  Eyes: Negative for pain, discharge, redness and visual disturbance  Respiratory: Negative for apnea, chest tightness, shortness of breath, wheezing and stridor  Cardiovascular: Negative for chest pain, palpitations and leg swelling  Gastrointestinal: Negative for abdominal distention, abdominal pain, blood in stool, constipation, diarrhea and vomiting  Endocrine: Negative for cold intolerance, heat intolerance, polydipsia, polyphagia and polyuria  Genitourinary: Negative for difficulty urinating, dysuria, flank pain, frequency, genital sores, hematuria and urgency  Musculoskeletal: Negative for arthralgias and back pain  Skin: Negative for rash and wound  Allergic/Immunologic: Negative for environmental allergies, food allergies and immunocompromised state  Neurological: Negative for dizziness, tremors, seizures, syncope, facial asymmetry, speech difficulty, weakness, light-headedness, numbness and headaches  Hematological: Negative for adenopathy  Does not bruise/bleed easily  Psychiatric/Behavioral: Negative for agitation, behavioral problems, dysphoric mood, hallucinations, self-injury, sleep disturbance and suicidal ideas  The patient is not hyperactive  Objective:     Physical Exam   Constitutional: He is oriented to person, place, and time  He appears well-developed  HENT:   Right Ear: External ear normal    Left Ear: External ear normal    Eyes: Right eye exhibits no discharge  Left eye exhibits no discharge  No scleral icterus  Neck: Carotid bruit is not present  No tracheal deviation present  No thyroid mass and no thyromegaly present     Cardiovascular: Normal rate, regular rhythm, normal heart sounds and intact distal pulses  Exam reveals no gallop and no friction rub  No murmur heard  Pulmonary/Chest: No respiratory distress  He has no wheezes  He has no rales  Musculoskeletal: He exhibits no edema  Lymphadenopathy:     He has no cervical adenopathy  Neurological: He is alert and oriented to person, place, and time  Coordination normal    Psychiatric: He has a normal mood and affect  His behavior is normal  Judgment and thought content normal    Nursing note and vitals reviewed        Vitals:    07/10/18 0917   Pulse: 78   Resp: 18   Temp: 98 6 °F (37 °C)   TempSrc: Tympanic   SpO2: 96%   Weight: 122 kg (270 lb 0 3 oz)   Height: 6' (1 829 m)

## 2018-07-24 ENCOUNTER — TELEPHONE (OUTPATIENT)
Dept: INTERNAL MEDICINE CLINIC | Facility: CLINIC | Age: 53
End: 2018-07-24

## 2018-07-24 DIAGNOSIS — D75.1 POLYCYTHEMIA: Primary | ICD-10-CM

## 2018-07-24 NOTE — TELEPHONE ENCOUNTER
Please create referral for Neurology dx polytolychemia, pt has an apt with Dr Daniel Lynn on 7/31/18

## 2018-07-24 NOTE — TELEPHONE ENCOUNTER
Please create referral for Neurology dx polytolychemia, pt has an apt with Dr Timur Forte on 7/31/18

## 2018-09-26 DIAGNOSIS — E78.5 HYPERLIPIDEMIA, UNSPECIFIED HYPERLIPIDEMIA TYPE: ICD-10-CM

## 2018-09-26 RX ORDER — ATORVASTATIN CALCIUM 20 MG/1
TABLET, FILM COATED ORAL
Qty: 30 TABLET | Refills: 5 | Status: SHIPPED | OUTPATIENT
Start: 2018-09-26 | End: 2019-04-26 | Stop reason: SDUPTHER

## 2019-02-05 DIAGNOSIS — M54.16 LUMBAR RADICULOPATHY: Primary | ICD-10-CM

## 2019-02-06 DIAGNOSIS — M54.5 CHRONIC LOW BACK PAIN, UNSPECIFIED BACK PAIN LATERALITY, WITH SCIATICA PRESENCE UNSPECIFIED: ICD-10-CM

## 2019-02-06 DIAGNOSIS — G89.29 CHRONIC LOW BACK PAIN, UNSPECIFIED BACK PAIN LATERALITY, WITH SCIATICA PRESENCE UNSPECIFIED: ICD-10-CM

## 2019-02-06 DIAGNOSIS — R39.89 ABNORMAL URINE COLOR: Primary | ICD-10-CM

## 2019-02-06 DIAGNOSIS — D75.1 POLYCYTHEMIA: ICD-10-CM

## 2019-02-06 DIAGNOSIS — M54.50 LUMBAR PAIN: ICD-10-CM

## 2019-02-07 ENCOUNTER — TELEPHONE (OUTPATIENT)
Dept: NEUROLOGY | Facility: CLINIC | Age: 54
End: 2019-02-07

## 2019-02-13 ENCOUNTER — TELEPHONE (OUTPATIENT)
Dept: NEUROLOGY | Facility: CLINIC | Age: 54
End: 2019-02-13

## 2019-02-13 NOTE — TELEPHONE ENCOUNTER
Called patient to see if he would be interested in being seen sooner by Dr Kami Schultz as a new patient  Patient was in a meeting so if he calls back that is why I called the patient

## 2019-02-26 ENCOUNTER — TELEPHONE (OUTPATIENT)
Dept: NEUROLOGY | Facility: CLINIC | Age: 54
End: 2019-02-26

## 2019-02-26 NOTE — TELEPHONE ENCOUNTER
Called patient to reschedule his new patient appointment that was missed today  No answer  Left voicemail

## 2019-03-18 ENCOUNTER — CONSULT (OUTPATIENT)
Dept: NEUROLOGY | Facility: CLINIC | Age: 54
End: 2019-03-18

## 2019-03-18 VITALS
HEIGHT: 72 IN | WEIGHT: 266 LBS | BODY MASS INDEX: 36.03 KG/M2 | HEART RATE: 63 BPM | SYSTOLIC BLOOD PRESSURE: 130 MMHG | DIASTOLIC BLOOD PRESSURE: 88 MMHG

## 2019-03-18 DIAGNOSIS — M54.16 LUMBAR RADICULOPATHY: Primary | ICD-10-CM

## 2019-03-18 PROCEDURE — 99243 OFF/OP CNSLTJ NEW/EST LOW 30: CPT | Performed by: PSYCHIATRY & NEUROLOGY

## 2019-03-18 NOTE — PROGRESS NOTES
Doug Barker is a 52 y o  male  Chief Complaint   Patient presents with    Back Pain       Assessment:  1  Lumbar radiculopathy          Discussion:  Would recommend an MRI scan of the lumbar spine to evaluate for his low back pain, he has gone for physical therapy last year for almost 2 months without much relief, he is not keen to go back for physical therapy, he is also not keen to go on any medication, I have advised him to avoid strenuous activity, to go to the hospital if has any worsening symptoms and call me, to maintain ideal body weight and to see me back in 2 months and follow up with family physician  Subjective:    HPI   Patient is here for evaluation of low back pain for the last 1 year, initially his pain was radiating to both the legs not associated with numbness and tingling worse with activities and relieved with rest, now his pain is mostly confined to the lower back without radiation into the legs, no bowel and bladder incontinence, no focal weakness, no history of trauma, he woke up 1 day with the back pain and having difficulty in ambulation, he has gone for physical therapy for 2 months without much relief, his pain currently is 4 on 10 worse with activities and at the end of the day and relieved with rest   No other neurological complaints      Vitals:    03/18/19 1541   BP: 130/88   BP Location: Left arm   Patient Position: Sitting   Cuff Size: Large   Pulse: 63   Weight: 121 kg (266 lb)   Height: 6' (1 829 m)       Current Medications    Current Outpatient Medications:     amLODIPine (NORVASC) 10 mg tablet, Take 1 tablet (10 mg total) by mouth daily, Disp: 90 tablet, Rfl: 2    atorvastatin (LIPITOR) 20 mg tablet, TAKE 1 TABLET BY MOUTH EVERY DAY, Disp: 30 tablet, Rfl: 5    lisinopril (ZESTRIL) 40 mg tablet, Take 1 tablet (40 mg total) by mouth daily, Disp: 90 tablet, Rfl: 2    metoprolol succinate (TOPROL-XL) 100 mg 24 hr tablet, Take 1 tablet (100 mg total) by mouth daily, Disp: 90 tablet, Rfl: 2      Allergies  Patient has no known allergies  Past Medical History  Past Medical History:   Diagnosis Date    Hyperlipemia     Hypertension          Past Surgical History:  History reviewed  No pertinent surgical history  Family History:  Family History   Problem Relation Age of Onset    Cancer Mother     Aneurysm Mother     Heart disease Father     Coronary artery disease Father     Diabetes Family        Social History:   reports that he has never smoked  He has never used smokeless tobacco  He reports that he drinks alcohol  He reports that he does not use drugs  I have reviewed the past medical history, surgical history, social and family history, current medications, allergies vitals, review of systems, and updated this information as appropriate today  Objective:    Physical Exam    Neurological Exam    GENERAL:  Cooperative in no acute distress  Well-developed and well-nourished    HEAD and NECK   Head is atraumatic normocephalic with no lesions or masses  Neck is supple with full range of motion    CARDIOVASCULAR  Carotid Arteries-no carotid bruits  NEUROLOGIC:  Mental Status-the patient is awake alert and oriented without aphasia or apraxia  Cranial Nerves: Visual fields are full to confrontation  Discs are flat  Limited exam as unable to dilate the pupils, Extraocular movements are full without nystagmus  Pupils are 2-1/2 mm and reactive  Face is symmetrical to light touch  Movements of facial expression move symmetrically  Hearing is normal to finger rub bilaterally  Soft palate lifts symmetrically  Shoulder shrug is symmetrical  Tongue is midline without atrophy  Motor: No drift is noted on arm extension  Strength is full in the upper and lower extremities with normal bulk and tone  Sensory: Intact to temperature and vibratory sensation in the upper and lower extremities bilaterally  Cortical function is intact    Coordination: Finger to nose testing is performed accurately  Romberg is negative  Gait reveals a normal base with symmetrical arm swing  Tandem walk is normal   Reflexes:        1+ and symmetrical  Toes are downgoing  Paraspinal muscle tenderness in the lumbar area          ROS:  Review of Systems   Constitutional: Negative  HENT: Positive for tinnitus and trouble swallowing  Eyes: Positive for visual disturbance (blurred vision)  Respiratory: Positive for cough (persistent)  Cardiovascular: Negative  Gastrointestinal: Negative  Endocrine: Negative  Genitourinary: Positive for frequency  Musculoskeletal: Positive for arthralgias (bilateral hands, knees, ankles ), back pain (low back ) and gait problem (painful while walking in low back )  Allergic/Immunologic: Negative  Hematological: Negative  Psychiatric/Behavioral: Positive for sleep disturbance (waking up at night )

## 2019-04-20 DIAGNOSIS — I10 HYPERTENSION, UNSPECIFIED TYPE: ICD-10-CM

## 2019-04-20 DIAGNOSIS — E78.5 HYPERLIPIDEMIA, UNSPECIFIED HYPERLIPIDEMIA TYPE: ICD-10-CM

## 2019-04-21 RX ORDER — ATORVASTATIN CALCIUM 20 MG/1
TABLET, FILM COATED ORAL
Qty: 30 TABLET | Refills: 5 | OUTPATIENT
Start: 2019-04-21

## 2019-04-21 RX ORDER — AMLODIPINE BESYLATE 5 MG/1
TABLET ORAL
Qty: 90 TABLET | Refills: 2 | OUTPATIENT
Start: 2019-04-21

## 2019-04-21 RX ORDER — METOPROLOL SUCCINATE 100 MG/1
TABLET, EXTENDED RELEASE ORAL
Qty: 90 TABLET | Refills: 2 | OUTPATIENT
Start: 2019-04-21

## 2019-04-21 RX ORDER — LISINOPRIL 40 MG/1
TABLET ORAL
Qty: 90 TABLET | Refills: 2 | OUTPATIENT
Start: 2019-04-21

## 2019-04-25 DIAGNOSIS — I10 HYPERTENSION, UNSPECIFIED TYPE: ICD-10-CM

## 2019-04-25 RX ORDER — METOPROLOL SUCCINATE 100 MG/1
100 TABLET, EXTENDED RELEASE ORAL DAILY
Qty: 90 TABLET | Refills: 2 | OUTPATIENT
Start: 2019-04-25

## 2019-04-25 RX ORDER — LISINOPRIL 40 MG/1
40 TABLET ORAL DAILY
Qty: 90 TABLET | Refills: 2 | OUTPATIENT
Start: 2019-04-25

## 2019-04-25 RX ORDER — AMLODIPINE BESYLATE 10 MG/1
10 TABLET ORAL DAILY
Qty: 90 TABLET | Refills: 2 | OUTPATIENT
Start: 2019-04-25

## 2019-04-26 ENCOUNTER — TELEPHONE (OUTPATIENT)
Dept: INTERNAL MEDICINE CLINIC | Facility: CLINIC | Age: 54
End: 2019-04-26

## 2019-04-26 DIAGNOSIS — E78.5 HYPERLIPIDEMIA, UNSPECIFIED HYPERLIPIDEMIA TYPE: ICD-10-CM

## 2019-04-26 DIAGNOSIS — I10 HYPERTENSION, UNSPECIFIED TYPE: ICD-10-CM

## 2019-04-26 RX ORDER — AMLODIPINE BESYLATE 10 MG/1
10 TABLET ORAL DAILY
Qty: 30 TABLET | Refills: 0 | Status: CANCELLED | OUTPATIENT
Start: 2019-04-26

## 2019-04-26 RX ORDER — LISINOPRIL 40 MG/1
40 TABLET ORAL DAILY
Qty: 30 TABLET | Refills: 0 | Status: CANCELLED | OUTPATIENT
Start: 2019-04-26

## 2019-04-26 RX ORDER — AMLODIPINE BESYLATE 10 MG/1
10 TABLET ORAL DAILY
Qty: 30 TABLET | Refills: 0 | Status: SHIPPED | OUTPATIENT
Start: 2019-04-26 | End: 2019-05-10 | Stop reason: SDUPTHER

## 2019-04-26 RX ORDER — AMLODIPINE BESYLATE 5 MG/1
TABLET ORAL
Qty: 90 TABLET | Refills: 2 | Status: SHIPPED | OUTPATIENT
Start: 2019-04-26 | End: 2019-05-10 | Stop reason: SDUPTHER

## 2019-04-26 RX ORDER — METOPROLOL SUCCINATE 100 MG/1
100 TABLET, EXTENDED RELEASE ORAL DAILY
Qty: 30 TABLET | Refills: 0 | Status: SHIPPED | OUTPATIENT
Start: 2019-04-26 | End: 2019-04-26 | Stop reason: SDUPTHER

## 2019-04-26 RX ORDER — ATORVASTATIN CALCIUM 20 MG/1
20 TABLET, FILM COATED ORAL DAILY
Qty: 30 TABLET | Refills: 0 | Status: SHIPPED | OUTPATIENT
Start: 2019-04-26 | End: 2019-05-10 | Stop reason: SDUPTHER

## 2019-04-26 RX ORDER — METOPROLOL SUCCINATE 100 MG/1
100 TABLET, EXTENDED RELEASE ORAL DAILY
Qty: 30 TABLET | Refills: 0 | Status: CANCELLED | OUTPATIENT
Start: 2019-04-26

## 2019-04-26 RX ORDER — METOPROLOL SUCCINATE 100 MG/1
TABLET, EXTENDED RELEASE ORAL
Qty: 90 TABLET | Refills: 2 | Status: SHIPPED | OUTPATIENT
Start: 2019-04-26 | End: 2019-05-10 | Stop reason: SDUPTHER

## 2019-04-26 RX ORDER — ATORVASTATIN CALCIUM 20 MG/1
20 TABLET, FILM COATED ORAL DAILY
Qty: 30 TABLET | Refills: 0 | Status: CANCELLED | OUTPATIENT
Start: 2019-04-26

## 2019-04-26 RX ORDER — LISINOPRIL 40 MG/1
40 TABLET ORAL DAILY
Qty: 30 TABLET | Refills: 0 | Status: SHIPPED | OUTPATIENT
Start: 2019-04-26 | End: 2019-05-10 | Stop reason: SDUPTHER

## 2019-05-10 ENCOUNTER — OFFICE VISIT (OUTPATIENT)
Dept: INTERNAL MEDICINE CLINIC | Facility: CLINIC | Age: 54
End: 2019-05-10

## 2019-05-10 VITALS
HEART RATE: 60 BPM | DIASTOLIC BLOOD PRESSURE: 90 MMHG | RESPIRATION RATE: 16 BRPM | BODY MASS INDEX: 35.62 KG/M2 | OXYGEN SATURATION: 94 % | SYSTOLIC BLOOD PRESSURE: 140 MMHG | HEIGHT: 72 IN | WEIGHT: 263 LBS | TEMPERATURE: 96.7 F

## 2019-05-10 DIAGNOSIS — I10 ESSENTIAL HYPERTENSION: Primary | ICD-10-CM

## 2019-05-10 DIAGNOSIS — J02.9 SORE THROAT: ICD-10-CM

## 2019-05-10 DIAGNOSIS — E78.5 HYPERLIPIDEMIA, UNSPECIFIED HYPERLIPIDEMIA TYPE: ICD-10-CM

## 2019-05-10 DIAGNOSIS — R79.89 TSH ELEVATION: ICD-10-CM

## 2019-05-10 DIAGNOSIS — R74.01 ELEVATED ALT MEASUREMENT: ICD-10-CM

## 2019-05-10 DIAGNOSIS — I10 HYPERTENSION, UNSPECIFIED TYPE: ICD-10-CM

## 2019-05-10 PROCEDURE — 99214 OFFICE O/P EST MOD 30 MIN: CPT | Performed by: NURSE PRACTITIONER

## 2019-05-10 RX ORDER — AMLODIPINE BESYLATE 10 MG/1
10 TABLET ORAL DAILY
Qty: 30 TABLET | Refills: 5 | Status: SHIPPED | OUTPATIENT
Start: 2019-05-10 | End: 2020-11-12 | Stop reason: ALTCHOICE

## 2019-05-10 RX ORDER — METOPROLOL SUCCINATE 100 MG/1
100 TABLET, EXTENDED RELEASE ORAL DAILY
Qty: 30 TABLET | Refills: 5 | Status: SHIPPED | OUTPATIENT
Start: 2019-05-10 | End: 2020-06-29

## 2019-05-10 RX ORDER — ATORVASTATIN CALCIUM 20 MG/1
20 TABLET, FILM COATED ORAL DAILY
Qty: 30 TABLET | Refills: 5 | Status: SHIPPED | OUTPATIENT
Start: 2019-05-10 | End: 2019-07-27 | Stop reason: SDUPTHER

## 2019-05-10 RX ORDER — LISINOPRIL 40 MG/1
40 TABLET ORAL DAILY
Qty: 30 TABLET | Refills: 5 | Status: SHIPPED | OUTPATIENT
Start: 2019-05-10 | End: 2019-09-30 | Stop reason: SDUPTHER

## 2019-07-27 DIAGNOSIS — E78.5 HYPERLIPIDEMIA, UNSPECIFIED HYPERLIPIDEMIA TYPE: ICD-10-CM

## 2019-07-28 RX ORDER — ATORVASTATIN CALCIUM 20 MG/1
TABLET, FILM COATED ORAL
Qty: 30 TABLET | Refills: 1 | Status: SHIPPED | OUTPATIENT
Start: 2019-07-28 | End: 2019-09-25 | Stop reason: SDUPTHER

## 2019-09-20 ENCOUNTER — OFFICE VISIT (OUTPATIENT)
Dept: INTERNAL MEDICINE CLINIC | Facility: CLINIC | Age: 54
End: 2019-09-20

## 2019-09-20 VITALS
TEMPERATURE: 98.6 F | HEART RATE: 76 BPM | DIASTOLIC BLOOD PRESSURE: 82 MMHG | HEIGHT: 72 IN | RESPIRATION RATE: 16 BRPM | BODY MASS INDEX: 35.35 KG/M2 | OXYGEN SATURATION: 97 % | SYSTOLIC BLOOD PRESSURE: 126 MMHG | WEIGHT: 261 LBS

## 2019-09-20 DIAGNOSIS — J06.9 UPPER RESPIRATORY INFECTION, ACUTE: Primary | ICD-10-CM

## 2019-09-20 PROCEDURE — 99213 OFFICE O/P EST LOW 20 MIN: CPT | Performed by: NURSE PRACTITIONER

## 2019-09-20 RX ORDER — AZITHROMYCIN 250 MG/1
TABLET, FILM COATED ORAL
Qty: 6 TABLET | Refills: 1 | Status: SHIPPED | OUTPATIENT
Start: 2019-09-20 | End: 2019-09-24

## 2019-09-20 RX ORDER — PREDNISONE 10 MG/1
TABLET ORAL
Qty: 30 TABLET | Refills: 0 | Status: SHIPPED | OUTPATIENT
Start: 2019-09-20 | End: 2019-12-10 | Stop reason: ALTCHOICE

## 2019-09-20 RX ORDER — GUAIFENESIN 600 MG
1200 TABLET, EXTENDED RELEASE 12 HR ORAL EVERY 12 HOURS SCHEDULED
Qty: 20 TABLET | Refills: 0
Start: 2019-09-20 | End: 2019-12-10 | Stop reason: ALTCHOICE

## 2019-09-20 RX ORDER — BENZONATATE 100 MG/1
100 CAPSULE ORAL 3 TIMES DAILY PRN
Qty: 20 CAPSULE | Refills: 0 | Status: SHIPPED | OUTPATIENT
Start: 2019-09-20 | End: 2019-12-10 | Stop reason: ALTCHOICE

## 2019-09-20 NOTE — PATIENT INSTRUCTIONS
Upper respiratory infection- start antibiotic, prednisone and Mucinex  You can take generic Tessalon perles for cough  Please call if not better  Follow up with me as needed and with Dr Jonathan Gaviria as scheduled in November, labs prior

## 2019-09-20 NOTE — PROGRESS NOTES
Assessment/Plan:    Upper respiratory infection- start antibiotic, prednisone and Mucinex  You can take generic Tessalon perles for cough  Please call if not better  Follow up with me as needed and with Dr John Park as scheduled in November, labs prior  Diagnoses and all orders for this visit:    Upper respiratory infection, acute  -     azithromycin (ZITHROMAX) 250 mg tablet; Take 2 tablets today then 1 tablet daily x 4 days  -     predniSONE 10 mg tablet; Take 4 tabs by mouth x 3 days, then 3 tabs by mouth x 3 days, then 2 tabs by mouth x 3 days, then 1 tab by mouth x 3 days  -     benzonatate (TESSALON PERLES) 100 mg capsule; Take 1 capsule (100 mg total) by mouth 3 (three) times a day as needed for cough  -     guaiFENesin (MUCINEX) 600 mg 12 hr tablet; Take 2 tablets (1,200 mg total) by mouth every 12 (twelve) hours        The patient was counseled regarding instructions for management, risk factor reductions, patient and family education,impressions, risks and benefits of treatment options, side effects of medications, importance of compliance with treatment  The treatment plan was reviewed with the patient/guardian and patient/guardian understands and agrees with the treatment plan              Current Outpatient Medications:     amLODIPine (NORVASC) 10 mg tablet, Take 1 tablet (10 mg total) by mouth daily, Disp: 30 tablet, Rfl: 5    atorvastatin (LIPITOR) 20 mg tablet, TAKE 1 TABLET EVERY DAY, Disp: 30 tablet, Rfl: 1    lisinopril (ZESTRIL) 40 mg tablet, Take 1 tablet (40 mg total) by mouth daily, Disp: 30 tablet, Rfl: 5    metoprolol succinate (TOPROL-XL) 100 mg 24 hr tablet, Take 1 tablet (100 mg total) by mouth daily, Disp: 30 tablet, Rfl: 5    azithromycin (ZITHROMAX) 250 mg tablet, Take 2 tablets today then 1 tablet daily x 4 days, Disp: 6 tablet, Rfl: 1    benzonatate (TESSALON PERLES) 100 mg capsule, Take 1 capsule (100 mg total) by mouth 3 (three) times a day as needed for cough, Disp: 20 capsule, Rfl: 0    guaiFENesin (MUCINEX) 600 mg 12 hr tablet, Take 2 tablets (1,200 mg total) by mouth every 12 (twelve) hours, Disp: 20 tablet, Rfl: 0    predniSONE 10 mg tablet, Take 4 tabs by mouth x 3 days, then 3 tabs by mouth x 3 days, then 2 tabs by mouth x 3 days, then 1 tab by mouth x 3 days, Disp: 30 tablet, Rfl: 0    Subjective:      Patient ID: Farooq Velasco is a 52 y o  male  Here with five days of cough, unable to expectorate  (+) shortness of breath, no fever/chills  Taking OTC cough medicine and Dayquil without relief  The following portions of the patient's history were reviewed and updated as appropriate:   He has a past medical history of Hyperlipemia and Hypertension  ,  does not have any pertinent problems on file  ,   has no past surgical history on file  ,  family history includes Aneurysm in his mother; Cancer in his mother; Coronary artery disease in his father; Diabetes in his family; Heart disease in his father  ,   reports that he has never smoked  He has never used smokeless tobacco  He reports that he drinks alcohol  He reports that he does not use drugs  ,  has No Known Allergies       Review of Systems   Constitutional: Negative  Respiratory: Positive for cough and shortness of breath  Cardiovascular: Negative  Musculoskeletal: Negative  Psychiatric/Behavioral: Negative  Objective:  /82 (BP Location: Left arm, Cuff Size: Large)   Pulse 76   Temp 98 6 °F (37 °C)   Resp 16   Ht 6' (1 829 m)   Wt 118 kg (261 lb)   SpO2 97%   BMI 35 40 kg/m²     Lab Review  No visits with results within 2 Month(s) from this visit     Latest known visit with results is:   Appointment on 05/31/2018   Component Date Value    EBV Early Antigen Ab, IgG 05/31/2018 <9 0     EBV VCA IgG 05/31/2018 102 0*    EBV VCA IgM 05/31/2018 <36 0     EBV Nuclear Ag Ab 05/31/2018 547 0*    EBV Interp  05/31/2018 Comment     THYROID MICROSOMAL ANTIB* 05/31/2018 17     Sed Rate 05/31/2018 1     T3, Free 05/31/2018 3 06     Free T4 05/31/2018 0 86     WBC 05/31/2018 8 15     RBC 05/31/2018 5 28     Hemoglobin 05/31/2018 16 9     Hematocrit 05/31/2018 50 8*    MCV 05/31/2018 96     MCH 05/31/2018 32 0     MCHC 05/31/2018 33 3     RDW 05/31/2018 12 9     MPV 05/31/2018 10 8     Platelets 72/84/0877 200     nRBC 05/31/2018 0     Neutrophils Relative 05/31/2018 64     Immat GRANS % 05/31/2018 0     Lymphocytes Relative 05/31/2018 26     Monocytes Relative 05/31/2018 7     Eosinophils Relative 05/31/2018 2     Basophils Relative 05/31/2018 1     Neutrophils Absolute 05/31/2018 5 28     Immature Grans Absolute 05/31/2018 0 03     Lymphocytes Absolute 05/31/2018 2 08     Monocytes Absolute 05/31/2018 0 55     Eosinophils Absolute 05/31/2018 0 16     Basophils Absolute 05/31/2018 0 05     Sodium 05/31/2018 142     Potassium 05/31/2018 3 9     Chloride 05/31/2018 106     CO2 05/31/2018 26     ANION GAP 05/31/2018 10     BUN 05/31/2018 13     Creatinine 05/31/2018 1 01     Glucose, Fasting 05/31/2018 118*    Calcium 05/31/2018 8 9     AST 05/31/2018 32     ALT 05/31/2018 70     Alkaline Phosphatase 05/31/2018 73     Total Protein 05/31/2018 7 7     Albumin 05/31/2018 3 8     Total Bilirubin 05/31/2018 0 50     eGFR 05/31/2018 84     Uric Acid 05/31/2018 7 3     LDL Direct 05/31/2018 110*    Triglycerides 05/31/2018 96     LYME AB IGG 05/31/2018 0 06     LYME AB IGM 05/31/2018 0 17     TSH 3RD GENERATON 05/31/2018 2 477     Erythropoietin 05/31/2018 4 5     Scan Result 05/31/2018 SEE WRITTEN REPORT     TSI 05/31/2018 <0 10         Imaging: No results found  Physical Exam   Constitutional: He is oriented to person, place, and time  He appears well-developed and well-nourished  Cardiovascular: Normal rate, regular rhythm, normal heart sounds and intact distal pulses  Pulmonary/Chest: Effort normal  He has wheezes     Musculoskeletal: Normal range of motion  Neurological: He is alert and oriented to person, place, and time  He has normal reflexes  Psychiatric: He has a normal mood and affect   His behavior is normal  Judgment and thought content normal

## 2019-09-25 DIAGNOSIS — E78.5 HYPERLIPIDEMIA, UNSPECIFIED HYPERLIPIDEMIA TYPE: ICD-10-CM

## 2019-09-26 RX ORDER — ATORVASTATIN CALCIUM 20 MG/1
TABLET, FILM COATED ORAL
Qty: 30 TABLET | Refills: 1 | Status: SHIPPED | OUTPATIENT
Start: 2019-09-26 | End: 2019-10-29 | Stop reason: SDUPTHER

## 2019-09-29 DIAGNOSIS — I10 HYPERTENSION, UNSPECIFIED TYPE: ICD-10-CM

## 2019-09-30 RX ORDER — LISINOPRIL 40 MG/1
TABLET ORAL
Qty: 30 TABLET | Refills: 2 | Status: SHIPPED | OUTPATIENT
Start: 2019-09-30 | End: 2019-10-26 | Stop reason: SDUPTHER

## 2019-10-26 DIAGNOSIS — I10 HYPERTENSION, UNSPECIFIED TYPE: ICD-10-CM

## 2019-10-29 DIAGNOSIS — E78.5 HYPERLIPIDEMIA, UNSPECIFIED HYPERLIPIDEMIA TYPE: ICD-10-CM

## 2019-10-29 RX ORDER — LISINOPRIL 40 MG/1
TABLET ORAL
Qty: 30 TABLET | Refills: 2 | Status: SHIPPED | OUTPATIENT
Start: 2019-10-29 | End: 2020-01-28

## 2019-11-01 RX ORDER — ATORVASTATIN CALCIUM 20 MG/1
TABLET, FILM COATED ORAL
Qty: 30 TABLET | Refills: 1 | Status: SHIPPED | OUTPATIENT
Start: 2019-11-01 | End: 2019-11-28 | Stop reason: SDUPTHER

## 2019-11-28 DIAGNOSIS — E78.5 HYPERLIPIDEMIA, UNSPECIFIED HYPERLIPIDEMIA TYPE: ICD-10-CM

## 2019-12-03 RX ORDER — ATORVASTATIN CALCIUM 20 MG/1
TABLET, FILM COATED ORAL
Qty: 30 TABLET | Refills: 1 | Status: SHIPPED | OUTPATIENT
Start: 2019-12-03 | End: 2019-12-29 | Stop reason: SDUPTHER

## 2019-12-10 ENCOUNTER — OFFICE VISIT (OUTPATIENT)
Dept: INTERNAL MEDICINE CLINIC | Facility: CLINIC | Age: 54
End: 2019-12-10

## 2019-12-10 VITALS
HEIGHT: 72 IN | RESPIRATION RATE: 18 BRPM | HEART RATE: 78 BPM | TEMPERATURE: 98.3 F | OXYGEN SATURATION: 95 % | SYSTOLIC BLOOD PRESSURE: 115 MMHG | BODY MASS INDEX: 36.65 KG/M2 | WEIGHT: 270.6 LBS | DIASTOLIC BLOOD PRESSURE: 70 MMHG

## 2019-12-10 DIAGNOSIS — Z11.4 SCREENING FOR HIV (HUMAN IMMUNODEFICIENCY VIRUS): ICD-10-CM

## 2019-12-10 DIAGNOSIS — I10 ESSENTIAL HYPERTENSION: ICD-10-CM

## 2019-12-10 DIAGNOSIS — R74.01 ELEVATED ALT MEASUREMENT: ICD-10-CM

## 2019-12-10 DIAGNOSIS — Z11.59 ENCOUNTER FOR HEPATITIS C SCREENING TEST FOR LOW RISK PATIENT: ICD-10-CM

## 2019-12-10 DIAGNOSIS — E78.5 HYPERLIPIDEMIA, UNSPECIFIED HYPERLIPIDEMIA TYPE: ICD-10-CM

## 2019-12-10 DIAGNOSIS — Z12.5 SCREENING FOR PROSTATE CANCER: ICD-10-CM

## 2019-12-10 DIAGNOSIS — M10.9 GOUT, UNSPECIFIED CAUSE, UNSPECIFIED CHRONICITY, UNSPECIFIED SITE: Primary | ICD-10-CM

## 2019-12-10 PROCEDURE — 99213 OFFICE O/P EST LOW 20 MIN: CPT | Performed by: INTERNAL MEDICINE

## 2019-12-10 NOTE — PATIENT INSTRUCTIONS
1 hypertension is at goal  2   History of gout history of Briana Landaverde increased LFTs monitor reorder laboratory work as well as ultrasound of liver unable to do so his without insurance risk benefits of not doing laboratory work was resumed reviewed

## 2019-12-10 NOTE — PROGRESS NOTES
Assessment/Plan:    1 hypertension is at goal  2  History of gout history of [place] increased LFTs monitor reorder laboratory work as well as ultrasound of liver unable to do so his without insurance risk benefits of not doing laboratory work was resumed reviewed           Diagnoses and all orders for this visit:    Gout, unspecified cause, unspecified chronicity, unspecified site  -     Donor, HIV 1/HIV 2 Ab Screen With Reflex(Es); Future  -     Hepatitis C antibody; Future  -     CBC and differential; Future  -     Comprehensive metabolic panel; Future  -     Hemoglobin A1C; Future  -     LDL cholesterol, direct; Future  -     Lipid Panel with Direct LDL reflex; Future  -     Gamma GT; Future  -     Uric acid; Future    Hyperlipidemia, unspecified hyperlipidemia type  -     Donor, HIV 1/HIV 2 Ab Screen With Reflex(Es); Future  -     Hepatitis C antibody; Future  -     CBC and differential; Future  -     Comprehensive metabolic panel; Future  -     Hemoglobin A1C; Future  -     LDL cholesterol, direct; Future  -     Lipid Panel with Direct LDL reflex; Future  -     Gamma GT; Future  -     Uric acid; Future    Essential hypertension  -     Donor, HIV 1/HIV 2 Ab Screen With Reflex(Es); Future  -     Hepatitis C antibody; Future  -     CBC and differential; Future  -     Comprehensive metabolic panel; Future  -     Hemoglobin A1C; Future  -     LDL cholesterol, direct; Future  -     Lipid Panel with Direct LDL reflex; Future  -     Gamma GT; Future  -     Uric acid; Future    Elevated ALT measurement  -     Donor, HIV 1/HIV 2 Ab Screen With Reflex(Es); Future  -     Hepatitis C antibody; Future  -     CBC and differential; Future  -     Comprehensive metabolic panel; Future  -     Hemoglobin A1C; Future  -     LDL cholesterol, direct; Future  -     Lipid Panel with Direct LDL reflex; Future  -     Gamma GT; Future  -     Uric acid;  Future    Screening for prostate cancer  -     Donor, HIV 1/HIV 2 Ab Screen With Reflex(Es); Future  -     Hepatitis C antibody; Future  -     CBC and differential; Future  -     Comprehensive metabolic panel; Future  -     Hemoglobin A1C; Future  -     LDL cholesterol, direct; Future  -     Lipid Panel with Direct LDL reflex; Future  -     PSA Total (Reflex To Free); Future  -     Gamma GT; Future  -     Uric acid; Future    Encounter for hepatitis C screening test for low risk patient  -     Donor, HIV 1/HIV 2 Ab Screen With Reflex(Es); Future  -     Hepatitis C antibody; Future  -     CBC and differential; Future  -     Comprehensive metabolic panel; Future  -     Hemoglobin A1C; Future  -     LDL cholesterol, direct; Future  -     Lipid Panel with Direct LDL reflex; Future  -     Gamma GT; Future  -     Uric acid; Future    Screening for HIV (human immunodeficiency virus)  -     Donor, HIV 1/HIV 2 Ab Screen With Reflex(Es); Future  -     Hepatitis C antibody; Future  -     CBC and differential; Future  -     Comprehensive metabolic panel; Future  -     Hemoglobin A1C; Future  -     LDL cholesterol, direct; Future  -     Lipid Panel with Direct LDL reflex; Future  -     Gamma GT; Future  -     Uric acid; Future        The patient was counseled regarding instructions for management, risk factor reductions, patient and family education,impressions, risks and benefits of treatment options, side effects of medications, importance of compliance with treatment  The treatment plan was reviewed with the patient/guardian and patient/guardian understands and agrees with the treatment plan              Current Outpatient Medications:     amLODIPine (NORVASC) 10 mg tablet, Take 1 tablet (10 mg total) by mouth daily, Disp: 30 tablet, Rfl: 5    atorvastatin (LIPITOR) 20 mg tablet, TAKE 1 TABLET EVERY DAY, Disp: 30 tablet, Rfl: 1    lisinopril (ZESTRIL) 40 mg tablet, TAKE 1 TABLET BY MOUTH EVERY DAY, Disp: 30 tablet, Rfl: 2    metoprolol succinate (TOPROL-XL) 100 mg 24 hr tablet, Take 1 tablet (100 mg total) by mouth daily, Disp: 30 tablet, Rfl: 5    Subjective:      Patient ID: Roosevelt Rouse is a 62 y o  male  Tolerating medications well      The following portions of the patient's history were reviewed and updated as appropriate:   He has a past medical history of Hyperlipemia and Hypertension  ,  does not have any pertinent problems on file  ,   has no past surgical history on file  ,  family history includes Aneurysm in his mother; Cancer in his mother; Coronary artery disease in his father; Diabetes in his family; Heart disease in his father  ,   reports that he has never smoked  He has never used smokeless tobacco  He reports that he drinks alcohol  He reports that he does not use drugs  ,  has No Known Allergies       Review of Systems   Constitutional: Negative for appetite change, chills, fatigue, fever and unexpected weight change  HENT: Negative for congestion, ear pain, facial swelling, hearing loss, mouth sores, nosebleeds, postnasal drip, rhinorrhea, sinus pain, sore throat, trouble swallowing and voice change  Eyes: Negative for pain, discharge, redness and visual disturbance  Respiratory: Negative for apnea, chest tightness, shortness of breath, wheezing and stridor  Cardiovascular: Negative for chest pain, palpitations and leg swelling  Gastrointestinal: Negative for abdominal distention, abdominal pain, blood in stool, constipation, diarrhea and vomiting  Endocrine: Negative for cold intolerance, heat intolerance, polydipsia, polyphagia and polyuria  Genitourinary: Negative for difficulty urinating, dysuria, flank pain, frequency, genital sores, hematuria and urgency  Musculoskeletal: Negative for arthralgias and back pain  Skin: Negative for rash and wound  Allergic/Immunologic: Negative for environmental allergies, food allergies and immunocompromised state     Neurological: Negative for dizziness, tremors, seizures, syncope, facial asymmetry, speech difficulty, weakness, light-headedness, numbness and headaches  Hematological: Negative for adenopathy  Does not bruise/bleed easily  Psychiatric/Behavioral: Negative for agitation, behavioral problems, dysphoric mood, hallucinations, self-injury, sleep disturbance and suicidal ideas  The patient is not hyperactive  Objective:  /70 (BP Location: Left arm, Patient Position: Sitting)   Pulse 78   Temp 98 3 °F (36 8 °C) (Tympanic)   Resp 18   Ht 6' (1 829 m)   Wt 123 kg (270 lb 9 6 oz)   SpO2 95%   BMI 36 70 kg/m²     Lab Review  No visits with results within 6 Month(s) from this visit     Latest known visit with results is:   Appointment on 05/31/2018   Component Date Value    EBV Early Antigen Ab, IgG 05/31/2018 <9 0     EBV VCA IgG 05/31/2018 102 0*    EBV VCA IgM 05/31/2018 <36 0     EBV Nuclear Ag Ab 05/31/2018 547 0*    EBV Interp  05/31/2018 Comment     THYROID MICROSOMAL ANTIB* 05/31/2018 17     Sed Rate 05/31/2018 1     T3, Free 05/31/2018 3 06     Free T4 05/31/2018 0 86     WBC 05/31/2018 8 15     RBC 05/31/2018 5 28     Hemoglobin 05/31/2018 16 9     Hematocrit 05/31/2018 50 8*    MCV 05/31/2018 96     MCH 05/31/2018 32 0     MCHC 05/31/2018 33 3     RDW 05/31/2018 12 9     MPV 05/31/2018 10 8     Platelets 83/19/4003 200     nRBC 05/31/2018 0     Neutrophils Relative 05/31/2018 64     Immat GRANS % 05/31/2018 0     Lymphocytes Relative 05/31/2018 26     Monocytes Relative 05/31/2018 7     Eosinophils Relative 05/31/2018 2     Basophils Relative 05/31/2018 1     Neutrophils Absolute 05/31/2018 5 28     Immature Grans Absolute 05/31/2018 0 03     Lymphocytes Absolute 05/31/2018 2 08     Monocytes Absolute 05/31/2018 0 55     Eosinophils Absolute 05/31/2018 0 16     Basophils Absolute 05/31/2018 0 05     Sodium 05/31/2018 142     Potassium 05/31/2018 3 9     Chloride 05/31/2018 106     CO2 05/31/2018 26     ANION GAP 05/31/2018 10     BUN 05/31/2018 13     Creatinine 05/31/2018 1 01     Glucose, Fasting 05/31/2018 118*    Calcium 05/31/2018 8 9     AST 05/31/2018 32     ALT 05/31/2018 70     Alkaline Phosphatase 05/31/2018 73     Total Protein 05/31/2018 7 7     Albumin 05/31/2018 3 8     Total Bilirubin 05/31/2018 0 50     eGFR 05/31/2018 84     Uric Acid 05/31/2018 7 3     LDL Direct 05/31/2018 110*    Triglycerides 05/31/2018 96     LYME AB IGG 05/31/2018 0 06     LYME AB IGM 05/31/2018 0 17     TSH 3RD GENERATON 05/31/2018 2 477     Erythropoietin 05/31/2018 4 5     Scan Result 05/31/2018 SEE WRITTEN REPORT     TSI 05/31/2018 <0 10          Imaging  @[email or web address]     No orders to display     No results found for this or any previous visit  Physical Exam   Constitutional: He is oriented to person, place, and time  He appears well-developed  HENT:   Right Ear: External ear normal    Left Ear: External ear normal    Eyes: Right eye exhibits no discharge  Left eye exhibits no discharge  No scleral icterus  Neck: Carotid bruit is not present  No tracheal deviation present  No thyroid mass and no thyromegaly present  Cardiovascular: Normal rate, regular rhythm, normal heart sounds and intact distal pulses  Exam reveals no gallop and no friction rub  No murmur heard  Pulmonary/Chest: No respiratory distress  He has no wheezes  He has no rales  Musculoskeletal: He exhibits no edema  Lymphadenopathy:     He has no cervical adenopathy  Neurological: He is alert and oriented to person, place, and time  Coordination normal    Psychiatric: He has a normal mood and affect  His behavior is normal  Judgment and thought content normal    Nursing note and vitals reviewed  BMI Counseling: Body mass index is 36 7 kg/m²   The BMI is above normal  Nutrition recommendations include reducing portion sizes, decreasing overall calorie intake, 3-5 servings of fruits/vegetables daily, reducing fast food intake, consuming healthier snacks, decreasing soda and/or juice intake, moderation in carbohydrate intake, increasing intake of lean protein and reducing intake of saturated fat and trans fat  Exercise recommendations include moderate aerobic physical activity for 150 minutes/week, exercising 3-5 times per week and strength training exercises

## 2019-12-29 DIAGNOSIS — E78.5 HYPERLIPIDEMIA, UNSPECIFIED HYPERLIPIDEMIA TYPE: ICD-10-CM

## 2019-12-30 RX ORDER — ATORVASTATIN CALCIUM 20 MG/1
TABLET, FILM COATED ORAL
Qty: 30 TABLET | Refills: 1 | Status: SHIPPED | OUTPATIENT
Start: 2019-12-30 | End: 2020-03-06

## 2020-01-28 DIAGNOSIS — I10 HYPERTENSION, UNSPECIFIED TYPE: ICD-10-CM

## 2020-01-28 RX ORDER — LISINOPRIL 40 MG/1
TABLET ORAL
Qty: 90 TABLET | Refills: 0 | Status: SHIPPED | OUTPATIENT
Start: 2020-01-28 | End: 2020-05-04

## 2020-02-10 DIAGNOSIS — I10 HYPERTENSION, UNSPECIFIED TYPE: ICD-10-CM

## 2020-02-10 RX ORDER — AMLODIPINE BESYLATE 5 MG/1
TABLET ORAL
Qty: 90 TABLET | Refills: 2 | Status: SHIPPED | OUTPATIENT
Start: 2020-02-10 | End: 2020-04-02

## 2020-03-05 DIAGNOSIS — E78.5 HYPERLIPIDEMIA, UNSPECIFIED HYPERLIPIDEMIA TYPE: ICD-10-CM

## 2020-03-06 RX ORDER — ATORVASTATIN CALCIUM 20 MG/1
TABLET, FILM COATED ORAL
Qty: 30 TABLET | Refills: 1 | Status: SHIPPED | OUTPATIENT
Start: 2020-03-06 | End: 2020-04-10

## 2020-04-02 ENCOUNTER — TELEMEDICINE (OUTPATIENT)
Dept: INTERNAL MEDICINE CLINIC | Facility: CLINIC | Age: 55
End: 2020-04-02

## 2020-04-02 DIAGNOSIS — J01.10 ACUTE NON-RECURRENT FRONTAL SINUSITIS: ICD-10-CM

## 2020-04-02 DIAGNOSIS — I10 ESSENTIAL HYPERTENSION: ICD-10-CM

## 2020-04-02 DIAGNOSIS — E78.5 HYPERLIPIDEMIA, UNSPECIFIED HYPERLIPIDEMIA TYPE: ICD-10-CM

## 2020-04-02 DIAGNOSIS — M10.9 GOUT, UNSPECIFIED CAUSE, UNSPECIFIED CHRONICITY, UNSPECIFIED SITE: ICD-10-CM

## 2020-04-02 DIAGNOSIS — R74.01 ELEVATED ALT MEASUREMENT: Primary | ICD-10-CM

## 2020-04-02 DIAGNOSIS — D75.1 POLYCYTHEMIA: ICD-10-CM

## 2020-04-02 PROCEDURE — G2012 BRIEF CHECK IN BY MD/QHP: HCPCS | Performed by: INTERNAL MEDICINE

## 2020-04-02 RX ORDER — AMOXICILLIN AND CLAVULANATE POTASSIUM 875; 125 MG/1; MG/1
1 TABLET, FILM COATED ORAL EVERY 12 HOURS SCHEDULED
Qty: 14 TABLET | Refills: 0 | Status: SHIPPED | OUTPATIENT
Start: 2020-04-02 | End: 2020-04-09

## 2020-04-02 RX ORDER — LACTOBACILLUS RHAMNOSUS GG 10B CELL
CAPSULE ORAL
Qty: 30 CAPSULE | Refills: 0 | Status: SHIPPED | OUTPATIENT
Start: 2020-04-02 | End: 2020-06-17 | Stop reason: ALTCHOICE

## 2020-04-02 RX ORDER — LORATADINE 10 MG/1
TABLET ORAL
Qty: 100 TABLET | Refills: 1 | Status: SHIPPED | OUTPATIENT
Start: 2020-04-02 | End: 2020-10-30

## 2020-04-07 DIAGNOSIS — E78.5 HYPERLIPIDEMIA, UNSPECIFIED HYPERLIPIDEMIA TYPE: ICD-10-CM

## 2020-04-10 RX ORDER — ATORVASTATIN CALCIUM 20 MG/1
TABLET, FILM COATED ORAL
Qty: 30 TABLET | Refills: 1 | Status: SHIPPED | OUTPATIENT
Start: 2020-04-10 | End: 2020-05-08

## 2020-05-04 DIAGNOSIS — I10 HYPERTENSION, UNSPECIFIED TYPE: ICD-10-CM

## 2020-05-04 RX ORDER — LISINOPRIL 40 MG/1
TABLET ORAL
Qty: 90 TABLET | Refills: 0 | Status: SHIPPED | OUTPATIENT
Start: 2020-05-04 | End: 2020-08-10

## 2020-05-08 DIAGNOSIS — E78.5 HYPERLIPIDEMIA, UNSPECIFIED HYPERLIPIDEMIA TYPE: ICD-10-CM

## 2020-05-08 RX ORDER — ATORVASTATIN CALCIUM 20 MG/1
TABLET, FILM COATED ORAL
Qty: 30 TABLET | Refills: 1 | Status: SHIPPED | OUTPATIENT
Start: 2020-05-08 | End: 2020-06-11

## 2020-06-09 DIAGNOSIS — E78.5 HYPERLIPIDEMIA, UNSPECIFIED HYPERLIPIDEMIA TYPE: ICD-10-CM

## 2020-06-11 RX ORDER — ATORVASTATIN CALCIUM 20 MG/1
TABLET, FILM COATED ORAL
Qty: 90 TABLET | Refills: 1 | Status: SHIPPED | OUTPATIENT
Start: 2020-06-11 | End: 2020-10-30

## 2020-06-17 ENCOUNTER — OFFICE VISIT (OUTPATIENT)
Dept: INTERNAL MEDICINE CLINIC | Facility: CLINIC | Age: 55
End: 2020-06-17

## 2020-06-17 VITALS
SYSTOLIC BLOOD PRESSURE: 130 MMHG | DIASTOLIC BLOOD PRESSURE: 80 MMHG | RESPIRATION RATE: 16 BRPM | HEIGHT: 72 IN | BODY MASS INDEX: 35.54 KG/M2 | OXYGEN SATURATION: 96 % | HEART RATE: 88 BPM | TEMPERATURE: 98.4 F | WEIGHT: 262.4 LBS

## 2020-06-17 DIAGNOSIS — E78.5 HYPERLIPIDEMIA, UNSPECIFIED HYPERLIPIDEMIA TYPE: ICD-10-CM

## 2020-06-17 DIAGNOSIS — R74.01 ELEVATED ALT MEASUREMENT: ICD-10-CM

## 2020-06-17 DIAGNOSIS — M54.50 CHRONIC LOW BACK PAIN, UNSPECIFIED BACK PAIN LATERALITY, UNSPECIFIED WHETHER SCIATICA PRESENT: ICD-10-CM

## 2020-06-17 DIAGNOSIS — I10 ESSENTIAL HYPERTENSION: ICD-10-CM

## 2020-06-17 DIAGNOSIS — R79.89 TSH ELEVATION: ICD-10-CM

## 2020-06-17 DIAGNOSIS — G89.29 CHRONIC LOW BACK PAIN, UNSPECIFIED BACK PAIN LATERALITY, UNSPECIFIED WHETHER SCIATICA PRESENT: ICD-10-CM

## 2020-06-17 DIAGNOSIS — K08.89 TOOTH ACHE: ICD-10-CM

## 2020-06-17 DIAGNOSIS — M10.9 GOUT, UNSPECIFIED CAUSE, UNSPECIFIED CHRONICITY, UNSPECIFIED SITE: ICD-10-CM

## 2020-06-17 DIAGNOSIS — Z11.4 SCREENING FOR HIV WITHOUT PRESENCE OF RISK FACTORS: ICD-10-CM

## 2020-06-17 PROBLEM — E87.6 HYPOKALEMIA: Status: RESOLVED | Noted: 2018-02-13 | Resolved: 2020-06-17

## 2020-06-17 PROBLEM — J02.9 SORE THROAT: Status: RESOLVED | Noted: 2018-05-31 | Resolved: 2020-06-17

## 2020-06-17 PROBLEM — E83.52 HYPERCALCEMIA: Status: RESOLVED | Noted: 2018-02-13 | Resolved: 2020-06-17

## 2020-06-17 PROCEDURE — 1036F TOBACCO NON-USER: CPT | Performed by: INTERNAL MEDICINE

## 2020-06-17 PROCEDURE — 3075F SYST BP GE 130 - 139MM HG: CPT | Performed by: INTERNAL MEDICINE

## 2020-06-17 PROCEDURE — 3008F BODY MASS INDEX DOCD: CPT | Performed by: INTERNAL MEDICINE

## 2020-06-17 PROCEDURE — 99213 OFFICE O/P EST LOW 20 MIN: CPT | Performed by: INTERNAL MEDICINE

## 2020-06-17 PROCEDURE — 3079F DIAST BP 80-89 MM HG: CPT | Performed by: INTERNAL MEDICINE

## 2020-06-17 RX ORDER — AMOXICILLIN 500 MG/1
500 CAPSULE ORAL EVERY 8 HOURS SCHEDULED
Qty: 30 CAPSULE | Refills: 0 | Status: SHIPPED | OUTPATIENT
Start: 2020-06-17 | End: 2020-06-27

## 2020-06-27 DIAGNOSIS — I10 HYPERTENSION, UNSPECIFIED TYPE: ICD-10-CM

## 2020-06-29 RX ORDER — METOPROLOL SUCCINATE 100 MG/1
TABLET, EXTENDED RELEASE ORAL
Qty: 90 TABLET | Refills: 2 | Status: SHIPPED | OUTPATIENT
Start: 2020-06-29 | End: 2021-03-03 | Stop reason: SDUPTHER

## 2020-08-10 DIAGNOSIS — I10 HYPERTENSION, UNSPECIFIED TYPE: ICD-10-CM

## 2020-08-10 RX ORDER — LISINOPRIL 40 MG/1
TABLET ORAL
Qty: 90 TABLET | Refills: 0 | Status: SHIPPED | OUTPATIENT
Start: 2020-08-10 | End: 2020-11-09

## 2020-10-30 DIAGNOSIS — J01.10 ACUTE NON-RECURRENT FRONTAL SINUSITIS: ICD-10-CM

## 2020-10-30 DIAGNOSIS — E78.5 HYPERLIPIDEMIA, UNSPECIFIED HYPERLIPIDEMIA TYPE: ICD-10-CM

## 2020-10-30 RX ORDER — LORATADINE 10 MG/1
TABLET ORAL
Qty: 100 TABLET | Refills: 1 | Status: SHIPPED | OUTPATIENT
Start: 2020-10-30 | End: 2021-05-28 | Stop reason: SDUPTHER

## 2020-10-30 RX ORDER — ATORVASTATIN CALCIUM 20 MG/1
TABLET, FILM COATED ORAL
Qty: 90 TABLET | Refills: 1 | Status: SHIPPED | OUTPATIENT
Start: 2020-10-30 | End: 2021-03-03 | Stop reason: SDUPTHER

## 2020-11-09 DIAGNOSIS — I10 HYPERTENSION, UNSPECIFIED TYPE: ICD-10-CM

## 2020-11-09 RX ORDER — LISINOPRIL 40 MG/1
TABLET ORAL
Qty: 90 TABLET | Refills: 0 | Status: SHIPPED | OUTPATIENT
Start: 2020-11-09 | End: 2021-03-03 | Stop reason: SDUPTHER

## 2020-11-12 DIAGNOSIS — I10 HYPERTENSION, UNSPECIFIED TYPE: ICD-10-CM

## 2020-11-12 DIAGNOSIS — I10 ESSENTIAL HYPERTENSION: Primary | ICD-10-CM

## 2020-11-12 RX ORDER — AMLODIPINE BESYLATE 5 MG/1
TABLET ORAL
Qty: 90 TABLET | Refills: 2 | OUTPATIENT
Start: 2020-11-12

## 2020-11-12 RX ORDER — AMLODIPINE BESYLATE 5 MG/1
5 TABLET ORAL DAILY
Qty: 30 TABLET | Refills: 0 | Status: SHIPPED | OUTPATIENT
Start: 2020-11-12 | End: 2020-11-18 | Stop reason: DRUGHIGH

## 2020-11-18 DIAGNOSIS — I10 ESSENTIAL HYPERTENSION: Primary | ICD-10-CM

## 2020-11-18 RX ORDER — AMLODIPINE BESYLATE 10 MG/1
10 TABLET ORAL DAILY
Qty: 90 TABLET | Refills: 0 | Status: SHIPPED | OUTPATIENT
Start: 2020-11-18 | End: 2021-02-17

## 2021-02-14 DIAGNOSIS — I10 ESSENTIAL HYPERTENSION: ICD-10-CM

## 2021-02-17 RX ORDER — AMLODIPINE BESYLATE 10 MG/1
TABLET ORAL
Qty: 30 TABLET | Refills: 0 | Status: SHIPPED | OUTPATIENT
Start: 2021-02-17 | End: 2021-03-03 | Stop reason: SDUPTHER

## 2021-03-03 ENCOUNTER — OFFICE VISIT (OUTPATIENT)
Dept: INTERNAL MEDICINE CLINIC | Facility: CLINIC | Age: 56
End: 2021-03-03

## 2021-03-03 VITALS
HEART RATE: 97 BPM | SYSTOLIC BLOOD PRESSURE: 130 MMHG | HEIGHT: 72 IN | OXYGEN SATURATION: 95 % | DIASTOLIC BLOOD PRESSURE: 80 MMHG | TEMPERATURE: 98.6 F | BODY MASS INDEX: 36.3 KG/M2 | RESPIRATION RATE: 16 BRPM | WEIGHT: 268 LBS

## 2021-03-03 DIAGNOSIS — E78.5 HYPERLIPIDEMIA, UNSPECIFIED HYPERLIPIDEMIA TYPE: ICD-10-CM

## 2021-03-03 DIAGNOSIS — I10 ESSENTIAL HYPERTENSION: ICD-10-CM

## 2021-03-03 DIAGNOSIS — I10 HYPERTENSION, UNSPECIFIED TYPE: ICD-10-CM

## 2021-03-03 DIAGNOSIS — R21 RASH: Primary | ICD-10-CM

## 2021-03-03 DIAGNOSIS — M79.671 RIGHT FOOT PAIN: ICD-10-CM

## 2021-03-03 PROBLEM — J06.9 UPPER RESPIRATORY INFECTION, ACUTE: Status: RESOLVED | Noted: 2019-09-20 | Resolved: 2021-03-03

## 2021-03-03 PROBLEM — K08.89 TOOTH ACHE: Status: RESOLVED | Noted: 2020-06-17 | Resolved: 2021-03-03

## 2021-03-03 PROBLEM — J01.10 ACUTE NON-RECURRENT FRONTAL SINUSITIS: Status: RESOLVED | Noted: 2020-04-02 | Resolved: 2021-03-03

## 2021-03-03 PROCEDURE — 99212 OFFICE O/P EST SF 10 MIN: CPT | Performed by: NURSE PRACTITIONER

## 2021-03-03 RX ORDER — LISINOPRIL 40 MG/1
40 TABLET ORAL DAILY
Qty: 90 TABLET | Refills: 2 | Status: SHIPPED | OUTPATIENT
Start: 2021-03-03 | End: 2021-12-06 | Stop reason: SDUPTHER

## 2021-03-03 RX ORDER — BETAMETHASONE DIPROPIONATE 0.5 MG/G
CREAM TOPICAL 2 TIMES DAILY
Qty: 30 G | Refills: 0 | Status: SHIPPED | OUTPATIENT
Start: 2021-03-03 | End: 2021-09-13

## 2021-03-03 RX ORDER — ATORVASTATIN CALCIUM 20 MG/1
20 TABLET, FILM COATED ORAL DAILY
Qty: 90 TABLET | Refills: 2 | Status: SHIPPED | OUTPATIENT
Start: 2021-03-03 | End: 2021-12-06 | Stop reason: SDUPTHER

## 2021-03-03 RX ORDER — METOPROLOL SUCCINATE 100 MG/1
100 TABLET, EXTENDED RELEASE ORAL DAILY
Qty: 90 TABLET | Refills: 2 | Status: SHIPPED | OUTPATIENT
Start: 2021-03-03 | End: 2021-11-12

## 2021-03-03 RX ORDER — AMLODIPINE BESYLATE 10 MG/1
10 TABLET ORAL DAILY
Qty: 90 TABLET | Refills: 2 | Status: SHIPPED | OUTPATIENT
Start: 2021-03-03 | End: 2021-08-26

## 2021-03-03 NOTE — PATIENT INSTRUCTIONS
1  Hypertension- At goal  Continue present medications  2  Hyperlipidemia- Continue Atorvastatin  Unable to obtain lab work due to lack of health insurance coverage  3  R heel pain- Xray of right foot ordered  Sounds like could be plantar fasciitis- to use tennis ball on bottom of foot as we discussed  4  Rash on RLE- Apply cream to affected area twice daily  Follow up in six months, labs prior

## 2021-03-03 NOTE — PROGRESS NOTES
BMI Counseling: Body mass index is 36 35 kg/m²  The BMI is above normal  Nutrition recommendations include decreasing portion sizes, encouraging healthy choices of fruits and vegetables, decreasing fast food intake, consuming healthier snacks, limiting drinks that contain sugar, moderation in carbohydrate intake, increasing intake of lean protein, reducing intake of saturated and trans fat and reducing intake of cholesterol  Exercise recommendations include exercising 3-5 times per week  No pharmacotherapy was ordered  Patient referred to PCP due to patient being overweight  Assessment/Plan:    1  Hypertension- At goal  Continue present medications  2  Hyperlipidemia- Continue Atorvastatin  Unable to obtain lab work due to lack of health insurance coverage  3  R heel pain- Xray of right foot ordered  Sounds like could be plantar fasciitis- to use tennis ball on bottom of foot as we discussed  4  Rash on RLE- Apply cream to affected area twice daily  Follow up in six months, labs prior  Diagnoses and all orders for this visit:    Rash  -     betamethasone dipropionate (DIPROSONE) 0 05 % cream; Apply topically 2 (two) times a day    Essential hypertension  -     amLODIPine (NORVASC) 10 mg tablet; Take 1 tablet (10 mg total) by mouth daily    Hyperlipidemia, unspecified hyperlipidemia type  -     atorvastatin (LIPITOR) 20 mg tablet; Take 1 tablet (20 mg total) by mouth daily    Hypertension, unspecified type  -     lisinopril (ZESTRIL) 40 mg tablet; Take 1 tablet (40 mg total) by mouth daily  -     metoprolol succinate (TOPROL-XL) 100 mg 24 hr tablet; Take 1 tablet (100 mg total) by mouth daily    Right foot pain  -     XR foot 3+ vw right; Future        The patient was counseled regarding instructions for management, risk factor reductions, patient and family education,impressions, risks and benefits of treatment options, side effects of medications, importance of compliance with treatment   The treatment plan was reviewed with the patient/guardian and patient/guardian understands and agrees with the treatment plan  Current Outpatient Medications:     amLODIPine (NORVASC) 10 mg tablet, Take 1 tablet (10 mg total) by mouth daily, Disp: 90 tablet, Rfl: 2    atorvastatin (LIPITOR) 20 mg tablet, Take 1 tablet (20 mg total) by mouth daily, Disp: 90 tablet, Rfl: 2    lisinopril (ZESTRIL) 40 mg tablet, Take 1 tablet (40 mg total) by mouth daily, Disp: 90 tablet, Rfl: 2    loratadine (CLARITIN) 10 mg tablet, 1 BY MOUTH EVERY DAY IN [place], Disp: 100 tablet, Rfl: 1    metoprolol succinate (TOPROL-XL) 100 mg 24 hr tablet, Take 1 tablet (100 mg total) by mouth daily, Disp: 90 tablet, Rfl: 2    betamethasone dipropionate (DIPROSONE) 0 05 % cream, Apply topically 2 (two) times a day, Disp: 30 g, Rfl: 0    Subjective:      Patient ID: De Palmer is a 60 y o  male  Here for follow up and blood pressure check  Also has a rash on his right calf, which has been coming and going for three years  Also has right heel pain which seems to be worse as the day goes on for the past six months and getting worse  The following portions of the patient's history were reviewed and updated as appropriate:   He has a past medical history of Hyperlipemia and Hypertension  ,  does not have any pertinent problems on file  ,   has no past surgical history on file  ,  family history includes Aneurysm in his mother; Cancer in his mother; Coronary artery disease in his father; Diabetes in his family; Heart disease in his father  ,   reports that he has never smoked  He has never used smokeless tobacco  He reports current alcohol use  He reports that he does not use drugs  ,  has No Known Allergies       Review of Systems   Constitutional: Negative  Respiratory: Negative  Cardiovascular: Negative  Musculoskeletal: Negative  R foot pain   Skin: Positive for rash  Psychiatric/Behavioral: Negative  Objective:  /80 (BP Location: Left arm, Patient Position: Sitting, Cuff Size: Standard)   Pulse 97   Temp 98 6 °F (37 °C) (Tympanic)   Resp 16   Ht 6' (1 829 m)   Wt 122 kg (268 lb)   SpO2 95%   BMI 36 35 kg/m²     Lab Review  No visits with results within 2 Month(s) from this visit     Latest known visit with results is:   Appointment on 05/31/2018   Component Date Value    EBV Early Antigen Ab, IgG 05/31/2018 <9 0     EBV VCA IgG 05/31/2018 102 0*    EBV VCA IgM 05/31/2018 <36 0     EBV Nuclear Ag Ab 05/31/2018 547 0*    EBV Interp  05/31/2018 Comment     THYROID MICROSOMAL ANTIB* 05/31/2018 17     Sed Rate 05/31/2018 1     T3, Free 05/31/2018 3 06     Free T4 05/31/2018 0 86     WBC 05/31/2018 8 15     RBC 05/31/2018 5 28     Hemoglobin 05/31/2018 16 9     Hematocrit 05/31/2018 50 8*    MCV 05/31/2018 96     MCH 05/31/2018 32 0     MCHC 05/31/2018 33 3     RDW 05/31/2018 12 9     MPV 05/31/2018 10 8     Platelets 78/20/8306 200     nRBC 05/31/2018 0     Neutrophils Relative 05/31/2018 64     Immat GRANS % 05/31/2018 0     Lymphocytes Relative 05/31/2018 26     Monocytes Relative 05/31/2018 7     Eosinophils Relative 05/31/2018 2     Basophils Relative 05/31/2018 1     Neutrophils Absolute 05/31/2018 5 28     Immature Grans Absolute 05/31/2018 0 03     Lymphocytes Absolute 05/31/2018 2 08     Monocytes Absolute 05/31/2018 0 55     Eosinophils Absolute 05/31/2018 0 16     Basophils Absolute 05/31/2018 0 05     Sodium 05/31/2018 142     Potassium 05/31/2018 3 9     Chloride 05/31/2018 106     CO2 05/31/2018 26     ANION GAP 05/31/2018 10     BUN 05/31/2018 13     Creatinine 05/31/2018 1 01     Glucose, Fasting 05/31/2018 118*    Calcium 05/31/2018 8 9     AST 05/31/2018 32     ALT 05/31/2018 70     Alkaline Phosphatase 05/31/2018 73     Total Protein 05/31/2018 7 7     Albumin 05/31/2018 3 8     Total Bilirubin 05/31/2018 0 50     eGFR 05/31/2018 84  Uric Acid 05/31/2018 7 3     LDL Direct 05/31/2018 110*    Triglycerides 05/31/2018 96     LYME AB IGG 05/31/2018 0 06     LYME AB IGM 05/31/2018 0 17     TSH 3RD GENERATON 05/31/2018 2 477     Erythropoietin 05/31/2018 4 5     Scan Result 05/31/2018 SEE WRITTEN REPORT     TSI 05/31/2018 <0 10         Imaging: No results found  Physical Exam  Constitutional:       Appearance: He is well-developed  Cardiovascular:      Rate and Rhythm: Normal rate and regular rhythm  Heart sounds: Normal heart sounds  Pulmonary:      Effort: Pulmonary effort is normal       Breath sounds: Normal breath sounds  Musculoskeletal: Normal range of motion  Neurological:      Mental Status: He is alert and oriented to person, place, and time  Deep Tendon Reflexes: Reflexes are normal and symmetric  Psychiatric:         Behavior: Behavior normal          Thought Content:  Thought content normal          Judgment: Judgment normal

## 2021-03-08 ENCOUNTER — HOSPITAL ENCOUNTER (OUTPATIENT)
Dept: RADIOLOGY | Facility: HOSPITAL | Age: 56
Discharge: HOME | End: 2021-03-08
Payer: COMMERCIAL

## 2021-03-08 DIAGNOSIS — M79.671 RIGHT FOOT PAIN: ICD-10-CM

## 2021-03-08 PROCEDURE — 73630 X-RAY EXAM OF FOOT: CPT

## 2021-05-28 ENCOUNTER — CLINICAL SUPPORT (OUTPATIENT)
Dept: INTERNAL MEDICINE CLINIC | Facility: CLINIC | Age: 56
End: 2021-05-28

## 2021-05-28 VITALS — SYSTOLIC BLOOD PRESSURE: 120 MMHG | DIASTOLIC BLOOD PRESSURE: 70 MMHG

## 2021-05-28 DIAGNOSIS — I10 ESSENTIAL HYPERTENSION: Primary | ICD-10-CM

## 2021-05-28 DIAGNOSIS — J01.10 ACUTE NON-RECURRENT FRONTAL SINUSITIS: ICD-10-CM

## 2021-05-28 RX ORDER — LORATADINE 10 MG/1
TABLET ORAL
Qty: 100 TABLET | Refills: 1 | Status: SHIPPED | OUTPATIENT
Start: 2021-05-28 | End: 2022-04-28

## 2021-05-28 NOTE — TELEPHONE ENCOUNTER
Loratadine 10 mg tablet-take 1 tablet by mouth every day in the spring    [facility]

## 2021-06-03 ENCOUNTER — OFFICE VISIT (OUTPATIENT)
Dept: INTERNAL MEDICINE CLINIC | Facility: CLINIC | Age: 56
End: 2021-06-03

## 2021-06-03 VITALS
BODY MASS INDEX: 36.3 KG/M2 | HEART RATE: 72 BPM | TEMPERATURE: 98.5 F | RESPIRATION RATE: 18 BRPM | DIASTOLIC BLOOD PRESSURE: 82 MMHG | HEIGHT: 72 IN | SYSTOLIC BLOOD PRESSURE: 126 MMHG | WEIGHT: 268 LBS | OXYGEN SATURATION: 96 %

## 2021-06-03 DIAGNOSIS — Z13.6 ENCOUNTER FOR SCREENING FOR CARDIOVASCULAR DISORDERS: ICD-10-CM

## 2021-06-03 DIAGNOSIS — M72.2 PLANTAR FASCIITIS OF RIGHT FOOT: Primary | ICD-10-CM

## 2021-06-03 DIAGNOSIS — M77.31 CALCANEAL SPUR OF FOOT, RIGHT: ICD-10-CM

## 2021-06-03 DIAGNOSIS — J32.1 CHRONIC FRONTAL SINUSITIS: ICD-10-CM

## 2021-06-03 DIAGNOSIS — I10 ESSENTIAL HYPERTENSION: ICD-10-CM

## 2021-06-03 DIAGNOSIS — Z12.11 ENCOUNTER FOR SCREENING FOR MALIGNANT NEOPLASM OF COLON: ICD-10-CM

## 2021-06-03 PROCEDURE — 99212 OFFICE O/P EST SF 10 MIN: CPT | Performed by: INTERNAL MEDICINE

## 2021-06-03 RX ORDER — AZITHROMYCIN 250 MG/1
TABLET, FILM COATED ORAL
Qty: 6 TABLET | Refills: 0 | Status: SHIPPED | OUTPATIENT
Start: 2021-06-03 | End: 2021-06-07

## 2021-06-03 RX ORDER — MELOXICAM 7.5 MG/1
7.5 TABLET ORAL DAILY
Qty: 30 TABLET | Refills: 5 | Status: SHIPPED | OUTPATIENT
Start: 2021-06-03 | End: 2021-09-13 | Stop reason: SDUPTHER

## 2021-06-03 NOTE — PROGRESS NOTES
BMI Counseling: Body mass index is 36 35 kg/m²  The BMI is above normal  Exercise recommendations include moderate physical activity 150 minutes/week  Depression Screening and Follow-up Plan: Patient's depression screening was positive with a PHQ-2 score of 0  Clincally patient does not have depression  No treatment is required  Assessment/Plan:    No problem-specific Assessment & Plan notes found for this encounter  Diagnoses and all orders for this visit:    Plantar fasciitis of right foot  -     meloxicam (MOBIC) 7 5 mg tablet; Take 1 tablet (7 5 mg total) by mouth daily  -     Cancel: Ambulatory referral to Podiatry; Future  -     Ambulatory referral to Podiatry; Future    Calcaneal spur of foot, right  -     Cancel: Ambulatory referral to Podiatry; Future  -     Ambulatory referral to Podiatry; Future    Chronic frontal sinusitis  -     azithromycin (ZITHROMAX) 250 mg tablet; Take 2 tablets today then 1 tablet daily x 4 days    Encounter for screening for cardiovascular disorders  -     Lipid panel; Future  -     Comprehensive metabolic panel; Future    Essential hypertension  -     CBC and differential; Future  -     Comprehensive metabolic panel; Future    Encounter for screening for malignant neoplasm of colon  -     Ambulatory referral to Gastroenterology; Future          Subjective:      Patient ID: Aubrie Sam is a 60 y o  male  Patient is a very pleasant 59 yo male establishing care with me  Previous patient of Dr David Allison  Recently seen by [facility] for some right foot pain and found to have calcaneal spur on xray associated with plantar fascitis  Present for over 1 month  Has tried to preform some exercises like rolling his foot on a tennis ball but it caused too much pain  Was given an antiinflammatory which helped but he does not remember which one  Reports recent sinus tenderness and headache  Associated with dry scratchy throat  Worse since it is allergy season   Present for 2 weeks  Has never done a colonoscopy  Does not smoke  The following portions of the patient's history were reviewed and updated as appropriate:   He  has a past medical history of Hyperlipemia and Hypertension  He   Patient Active Problem List    Diagnosis Date Noted    Rash 03/03/2021    Right foot pain 03/03/2021    Polycythemia 05/31/2018    Lumbar pain 02/13/2018    Gout 04/21/2017    Arthralgia 04/12/2017    Low back pain 04/12/2017    Lumbar radiculopathy 04/12/2017    Obesity 02/02/2016    Elevated ALT measurement 12/14/2015    Edema 12/02/2015    Myalgia 12/02/2015    Hyperlipidemia 05/06/2015    Abnormal radiographic examination 06/19/2013    Erectile dysfunction of non-organic origin 12/07/2012    Esophageal reflux 12/07/2012    Generalized anxiety disorder 12/07/2012    Hypertension 12/07/2012     He  has no past surgical history on file  His family history includes Aneurysm in his mother; Cancer in his mother; Coronary artery disease in his father; Diabetes in his family; Heart disease in his father  He  reports that he has never smoked  He has never used smokeless tobacco  He reports current alcohol use  He reports that he does not use drugs    Current Outpatient Medications   Medication Sig Dispense Refill    amLODIPine (NORVASC) 10 mg tablet Take 1 tablet (10 mg total) by mouth daily 90 tablet 2    atorvastatin (LIPITOR) 20 mg tablet Take 1 tablet (20 mg total) by mouth daily 90 tablet 2    betamethasone dipropionate (DIPROSONE) 0 05 % cream Apply topically 2 (two) times a day 30 g 0    lisinopril (ZESTRIL) 40 mg tablet Take 1 tablet (40 mg total) by mouth daily 90 tablet 2    loratadine (CLARITIN) 10 mg tablet 1 BY MOUTH EVERY DAY IN SPRING 100 tablet 1    metoprolol succinate (TOPROL-XL) 100 mg 24 hr tablet Take 1 tablet (100 mg total) by mouth daily 90 tablet 2    azithromycin (ZITHROMAX) 250 mg tablet Take 2 tablets today then 1 tablet daily x 4 days 6 tablet 0  meloxicam (MOBIC) 7 5 mg tablet Take 1 tablet (7 5 mg total) by mouth daily 30 tablet 5     No current facility-administered medications for this visit  Current Outpatient Medications on File Prior to Visit   Medication Sig    amLODIPine (NORVASC) 10 mg tablet Take 1 tablet (10 mg total) by mouth daily    atorvastatin (LIPITOR) 20 mg tablet Take 1 tablet (20 mg total) by mouth daily    betamethasone dipropionate (DIPROSONE) 0 05 % cream Apply topically 2 (two) times a day    lisinopril (ZESTRIL) 40 mg tablet Take 1 tablet (40 mg total) by mouth daily    loratadine (CLARITIN) 10 mg tablet 1 BY MOUTH EVERY DAY IN [facility]    metoprolol succinate (TOPROL-XL) 100 mg 24 hr tablet Take 1 tablet (100 mg total) by mouth daily     No current facility-administered medications on file prior to visit  He has No Known Allergies       Review of Systems   HENT: Positive for congestion, sinus pressure, sinus pain and sore throat  Eyes: Negative for photophobia, pain, redness and visual disturbance  Respiratory: Negative for shortness of breath and wheezing  Cardiovascular: Negative for chest pain, palpitations and leg swelling  Gastrointestinal: Negative for blood in stool, constipation and diarrhea  Endocrine: Negative for polydipsia, polyphagia and polyuria  Genitourinary: Negative for decreased urine volume, difficulty urinating, dysuria and hematuria  Musculoskeletal: Positive for arthralgias and gait problem  Right foot pain   Neurological: Negative for dizziness, tremors, speech difficulty, weakness, light-headedness and headaches  All other systems reviewed and are negative  Objective:      /82 (BP Location: Left arm, Patient Position: Sitting, Cuff Size: Large)   Pulse 72   Temp 98 5 °F (36 9 °C) (Tympanic)   Resp 18   Ht 6' (1 829 m)   Wt 122 kg (268 lb)   SpO2 96%   BMI 36 35 kg/m²          Physical Exam  Vitals signs and nursing note reviewed  Constitutional:       Appearance: Normal appearance  He is obese  HENT:      Head: Normocephalic and atraumatic  Right Ear: Tympanic membrane normal       Left Ear: There is impacted cerumen  Nose: Nose normal       Mouth/Throat:      Mouth: Mucous membranes are moist    Neck:      Musculoskeletal: Normal range of motion  Cardiovascular:      Rate and Rhythm: Normal rate and regular rhythm  Heart sounds: Normal heart sounds  Pulmonary:      Effort: Pulmonary effort is normal       Breath sounds: Normal breath sounds  Musculoskeletal: Normal range of motion  General: Tenderness and signs of injury present  Right lower leg: No edema  Left lower leg: No edema  Lymphadenopathy:      Cervical: No cervical adenopathy  Skin:     General: Skin is warm and dry  Neurological:      General: No focal deficit present  Mental Status: He is alert and oriented to person, place, and time  Psychiatric:         Mood and Affect: Mood normal          Behavior: Behavior normal          Thought Content:  Thought content normal          Judgment: Judgment normal

## 2021-08-02 ENCOUNTER — TELEPHONE (OUTPATIENT)
Dept: INTERNAL MEDICINE CLINIC | Facility: CLINIC | Age: 56
End: 2021-08-02

## 2021-08-26 DIAGNOSIS — I10 ESSENTIAL HYPERTENSION: ICD-10-CM

## 2021-08-26 RX ORDER — AMLODIPINE BESYLATE 10 MG/1
TABLET ORAL
Qty: 90 TABLET | Refills: 2 | Status: SHIPPED | OUTPATIENT
Start: 2021-08-26 | End: 2022-04-28 | Stop reason: SDUPTHER

## 2021-09-13 ENCOUNTER — OFFICE VISIT (OUTPATIENT)
Dept: INTERNAL MEDICINE CLINIC | Facility: CLINIC | Age: 56
End: 2021-09-13
Payer: COMMERCIAL

## 2021-09-13 VITALS
BODY MASS INDEX: 36.49 KG/M2 | HEIGHT: 72 IN | OXYGEN SATURATION: 95 % | RESPIRATION RATE: 20 BRPM | WEIGHT: 269.4 LBS | DIASTOLIC BLOOD PRESSURE: 82 MMHG | SYSTOLIC BLOOD PRESSURE: 126 MMHG | TEMPERATURE: 99.3 F | HEART RATE: 83 BPM

## 2021-09-13 DIAGNOSIS — B96.89 BACTERIAL CONJUNCTIVITIS OF BOTH EYES: Primary | ICD-10-CM

## 2021-09-13 DIAGNOSIS — M72.2 PLANTAR FASCIITIS OF RIGHT FOOT: ICD-10-CM

## 2021-09-13 DIAGNOSIS — Z11.59 ENCOUNTER FOR HEPATITIS C SCREENING TEST FOR LOW RISK PATIENT: ICD-10-CM

## 2021-09-13 DIAGNOSIS — H10.9 BACTERIAL CONJUNCTIVITIS OF BOTH EYES: Primary | ICD-10-CM

## 2021-09-13 DIAGNOSIS — Z11.4 ENCOUNTER FOR SCREENING FOR HIV: ICD-10-CM

## 2021-09-13 PROCEDURE — 1036F TOBACCO NON-USER: CPT | Performed by: INTERNAL MEDICINE

## 2021-09-13 PROCEDURE — 3008F BODY MASS INDEX DOCD: CPT | Performed by: INTERNAL MEDICINE

## 2021-09-13 PROCEDURE — 3079F DIAST BP 80-89 MM HG: CPT | Performed by: INTERNAL MEDICINE

## 2021-09-13 PROCEDURE — 3074F SYST BP LT 130 MM HG: CPT | Performed by: INTERNAL MEDICINE

## 2021-09-13 PROCEDURE — 99214 OFFICE O/P EST MOD 30 MIN: CPT | Performed by: INTERNAL MEDICINE

## 2021-09-13 RX ORDER — MELOXICAM 7.5 MG/1
7.5 TABLET ORAL DAILY
Qty: 30 TABLET | Refills: 5 | Status: SHIPPED | OUTPATIENT
Start: 2021-09-13

## 2021-09-13 RX ORDER — AMOXICILLIN AND CLAVULANATE POTASSIUM 875; 125 MG/1; MG/1
1 TABLET, FILM COATED ORAL EVERY 12 HOURS SCHEDULED
Qty: 20 TABLET | Refills: 0 | Status: SHIPPED | OUTPATIENT
Start: 2021-09-13 | End: 2021-09-23

## 2021-09-13 NOTE — PROGRESS NOTES
Assessment/Plan:    No problem-specific Assessment & Plan notes found for this encounter  Diagnoses and all orders for this visit:    Bacterial conjunctivitis of both eyes  -     amoxicillin-clavulanate (Augmentin) 875-125 mg per tablet; Take 1 tablet by mouth every 12 (twelve) hours for 10 days  -     ciprofloxacin (CILOXAN) 0 3 % ophthalmic ointment; Administer to both eyes 2 (two) times a day for 5 days    Plantar fasciitis of right foot  -     meloxicam (MOBIC) 7 5 mg tablet; Take 1 tablet (7 5 mg total) by mouth daily    Encounter for hepatitis C screening test for low risk patient  -     Hepatitis C antibody; Future    Encounter for screening for HIV  -     HIV 1/2 Antigen/Antibody (4th Generation) w Reflex [facility]; Future          Subjective:      Patient ID: Vivi Cook is a 60 y o  male  Patient is a 59 yo male here with c/o eye redness, discharge, eye pain for the past 1 month  He does have seasonal allergies  He is open to getting the covid vaccine  Eye Pain   Associated symptoms include an eye discharge, eye redness and itching  The following portions of the patient's history were reviewed and updated as appropriate:   He  has a past medical history of Hyperlipemia and Hypertension    Current Outpatient Medications   Medication Sig Dispense Refill    amLODIPine (NORVASC) 10 mg tablet TAKE 1 TABLET BY MOUTH EVERY DAY 90 tablet 2    atorvastatin (LIPITOR) 20 mg tablet Take 1 tablet (20 mg total) by mouth daily 90 tablet 2    lisinopril (ZESTRIL) 40 mg tablet Take 1 tablet (40 mg total) by mouth daily 90 tablet 2    loratadine (CLARITIN) 10 mg tablet 1 BY MOUTH EVERY DAY IN SPRING 100 tablet 1    meloxicam (MOBIC) 7 5 mg tablet Take 1 tablet (7 5 mg total) by mouth daily 30 tablet 5    metoprolol succinate (TOPROL-XL) 100 mg 24 hr tablet Take 1 tablet (100 mg total) by mouth daily 90 tablet 2    amoxicillin-clavulanate (Augmentin) 875-125 mg per tablet Take 1 tablet by mouth every 12 (twelve) hours for 10 days 20 tablet 0    ciprofloxacin (CILOXAN) 0 3 % ophthalmic ointment Administer to both eyes 2 (two) times a day for 5 days 1 g 0     No current facility-administered medications for this visit       Review of Systems   Eyes: Positive for pain, discharge, redness and itching  Musculoskeletal: Positive for arthralgias, back pain and gait problem  Right foot pain   All other systems reviewed and are negative  Objective:      /82 (BP Location: Left arm, Patient Position: Sitting, Cuff Size: Large)   Pulse 83   Temp 99 3 °F (37 4 °C) (Tympanic)   Resp 20   Ht 6' (1 829 m)   Wt 122 kg (269 lb 6 4 oz)   SpO2 95%   BMI 36 54 kg/m²          Physical Exam  Vitals and nursing note reviewed  Constitutional:       Appearance: Normal appearance  He is obese  HENT:      Head: Normocephalic and atraumatic  Eyes:      Conjunctiva/sclera:      Right eye: Right conjunctiva is injected  Left eye: Left conjunctiva is injected  Cardiovascular:      Rate and Rhythm: Normal rate  Pulmonary:      Effort: Pulmonary effort is normal    Musculoskeletal:         General: Normal range of motion  Cervical back: Normal range of motion  Skin:     General: Skin is warm and dry  Neurological:      General: No focal deficit present  Mental Status: He is alert and oriented to person, place, and time  Mental status is at baseline  Psychiatric:         Mood and Affect: Mood normal          Behavior: Behavior normal          Thought Content: Thought content normal          Judgment: Judgment normal        BMI Counseling: Body mass index is 36 54 kg/m²  The BMI is above normal  Nutrition recommendations include encouraging healthy choices of fruits and vegetables  Exercise recommendations include moderate physical activity 150 minutes/week  Depression Screening and Follow-up Plan: Clincally patient does not have depression  No treatment is required

## 2021-09-15 ENCOUNTER — TELEPHONE (OUTPATIENT)
Dept: INTERNAL MEDICINE CLINIC | Facility: CLINIC | Age: 56
End: 2021-09-15

## 2021-09-15 NOTE — TELEPHONE ENCOUNTER
Patient is requesting a different ointment or drops for his eyes  The one sent was over $300  Pharmacist said there are a lot that are cheaper than this  Can you send something different pharmacy?     [facility]/[place]    Call Back #[phone]

## 2021-11-12 DIAGNOSIS — I10 HYPERTENSION, UNSPECIFIED TYPE: ICD-10-CM

## 2021-11-12 RX ORDER — METOPROLOL SUCCINATE 100 MG/1
TABLET, EXTENDED RELEASE ORAL
Qty: 90 TABLET | Refills: 2 | Status: SHIPPED | OUTPATIENT
Start: 2021-11-12 | End: 2021-11-12 | Stop reason: SDUPTHER

## 2021-11-15 RX ORDER — METOPROLOL SUCCINATE 100 MG/1
100 TABLET, EXTENDED RELEASE ORAL DAILY
Qty: 90 TABLET | Refills: 2 | Status: SHIPPED | OUTPATIENT
Start: 2021-11-15 | End: 2022-04-28 | Stop reason: SDUPTHER

## 2021-12-04 DIAGNOSIS — E78.5 HYPERLIPIDEMIA, UNSPECIFIED HYPERLIPIDEMIA TYPE: ICD-10-CM

## 2021-12-04 DIAGNOSIS — I10 HYPERTENSION, UNSPECIFIED TYPE: ICD-10-CM

## 2021-12-06 RX ORDER — ATORVASTATIN CALCIUM 20 MG/1
TABLET, FILM COATED ORAL
Qty: 90 TABLET | Refills: 2 | Status: SHIPPED | OUTPATIENT
Start: 2021-12-06 | End: 2022-04-28 | Stop reason: SDUPTHER

## 2021-12-06 RX ORDER — LISINOPRIL 40 MG/1
TABLET ORAL
Qty: 90 TABLET | Refills: 2 | Status: SHIPPED | OUTPATIENT
Start: 2021-12-06 | End: 2022-04-28 | Stop reason: SDUPTHER

## 2021-12-13 ENCOUNTER — TELEMEDICINE (OUTPATIENT)
Dept: INTERNAL MEDICINE CLINIC | Facility: CLINIC | Age: 56
End: 2021-12-13
Payer: COMMERCIAL

## 2021-12-13 DIAGNOSIS — Z20.822 EXPOSURE TO COVID-19 VIRUS: Primary | ICD-10-CM

## 2021-12-13 DIAGNOSIS — B34.9 VIRAL SYNDROME: Primary | ICD-10-CM

## 2021-12-13 PROCEDURE — 99212 OFFICE O/P EST SF 10 MIN: CPT | Performed by: NURSE PRACTITIONER

## 2021-12-13 PROCEDURE — U0003 INFECTIOUS AGENT DETECTION BY NUCLEIC ACID (DNA OR RNA); SEVERE ACUTE RESPIRATORY SYNDROME CORONAVIRUS 2 (SARS-COV-2) (CORONAVIRUS DISEASE [COVID-19]), AMPLIFIED PROBE TECHNIQUE, MAKING USE OF HIGH THROUGHPUT TECHNOLOGIES AS DESCRIBED BY CMS-2020-01-R: HCPCS | Performed by: NURSE PRACTITIONER

## 2021-12-13 PROCEDURE — U0005 INFEC AGEN DETEC AMPLI PROBE: HCPCS | Performed by: NURSE PRACTITIONER

## 2021-12-14 LAB — SARS-COV-2 RNA RESP QL NAA+PROBE: POSITIVE

## 2021-12-15 ENCOUNTER — TELEMEDICINE (OUTPATIENT)
Dept: INTERNAL MEDICINE CLINIC | Facility: CLINIC | Age: 56
End: 2021-12-15
Payer: COMMERCIAL

## 2021-12-15 VITALS — HEIGHT: 72 IN | WEIGHT: 269 LBS | BODY MASS INDEX: 36.44 KG/M2

## 2021-12-15 DIAGNOSIS — U07.1 COVID-19: Primary | ICD-10-CM

## 2021-12-15 PROCEDURE — 99213 OFFICE O/P EST LOW 20 MIN: CPT | Performed by: NURSE PRACTITIONER

## 2021-12-21 ENCOUNTER — TELEPHONE (OUTPATIENT)
Dept: INTERNAL MEDICINE CLINIC | Facility: CLINIC | Age: 56
End: 2021-12-21

## 2021-12-21 ENCOUNTER — TELEMEDICINE (OUTPATIENT)
Dept: INTERNAL MEDICINE CLINIC | Facility: CLINIC | Age: 56
End: 2021-12-21
Payer: COMMERCIAL

## 2021-12-21 VITALS — HEIGHT: 72 IN | BODY MASS INDEX: 36.44 KG/M2 | WEIGHT: 269 LBS

## 2021-12-21 DIAGNOSIS — J01.40 ACUTE PANSINUSITIS, RECURRENCE NOT SPECIFIED: Primary | ICD-10-CM

## 2021-12-21 DIAGNOSIS — U07.1 COVID-19: ICD-10-CM

## 2021-12-21 PROCEDURE — 99214 OFFICE O/P EST MOD 30 MIN: CPT | Performed by: INTERNAL MEDICINE

## 2021-12-21 RX ORDER — AZITHROMYCIN 250 MG/1
TABLET, FILM COATED ORAL
Qty: 6 TABLET | Refills: 0 | Status: SHIPPED | OUTPATIENT
Start: 2021-12-21 | End: 2021-12-25

## 2021-12-21 RX ORDER — METHYLPREDNISOLONE 4 MG/1
TABLET ORAL
Qty: 21 EACH | Refills: 0 | Status: SHIPPED | OUTPATIENT
Start: 2021-12-21 | End: 2021-12-28

## 2021-12-27 ENCOUNTER — TELEPHONE (OUTPATIENT)
Dept: INTERNAL MEDICINE CLINIC | Facility: CLINIC | Age: 56
End: 2021-12-27

## 2021-12-28 ENCOUNTER — TELEMEDICINE (OUTPATIENT)
Dept: INTERNAL MEDICINE CLINIC | Facility: CLINIC | Age: 56
End: 2021-12-28
Payer: COMMERCIAL

## 2021-12-28 VITALS — HEIGHT: 72 IN | WEIGHT: 269 LBS | BODY MASS INDEX: 36.44 KG/M2

## 2021-12-28 DIAGNOSIS — J01.90 SUBACUTE SINUSITIS, UNSPECIFIED LOCATION: Primary | ICD-10-CM

## 2021-12-28 PROCEDURE — 99214 OFFICE O/P EST MOD 30 MIN: CPT | Performed by: INTERNAL MEDICINE

## 2021-12-28 PROCEDURE — 1036F TOBACCO NON-USER: CPT | Performed by: INTERNAL MEDICINE

## 2021-12-28 PROCEDURE — 3008F BODY MASS INDEX DOCD: CPT | Performed by: INTERNAL MEDICINE

## 2021-12-28 PROCEDURE — 3725F SCREEN DEPRESSION PERFORMED: CPT | Performed by: INTERNAL MEDICINE

## 2021-12-28 RX ORDER — METHYLPREDNISOLONE 4 MG/1
TABLET ORAL
Qty: 21 EACH | Refills: 0 | Status: SHIPPED | OUTPATIENT
Start: 2021-12-28 | End: 2022-04-28

## 2021-12-28 RX ORDER — AMOXICILLIN AND CLAVULANATE POTASSIUM 875; 125 MG/1; MG/1
1 TABLET, FILM COATED ORAL EVERY 12 HOURS SCHEDULED
Qty: 20 TABLET | Refills: 0 | Status: SHIPPED | OUTPATIENT
Start: 2021-12-28 | End: 2022-01-07

## 2022-04-11 DIAGNOSIS — I10 ESSENTIAL HYPERTENSION: ICD-10-CM

## 2022-04-11 DIAGNOSIS — M72.2 PLANTAR FASCIITIS OF RIGHT FOOT: ICD-10-CM

## 2022-04-11 DIAGNOSIS — J01.10 ACUTE NON-RECURRENT FRONTAL SINUSITIS: ICD-10-CM

## 2022-04-11 DIAGNOSIS — E78.5 HYPERLIPIDEMIA, UNSPECIFIED HYPERLIPIDEMIA TYPE: ICD-10-CM

## 2022-04-11 RX ORDER — ATORVASTATIN CALCIUM 20 MG/1
20 TABLET, FILM COATED ORAL DAILY
Qty: 90 TABLET | Refills: 1 | Status: CANCELLED | OUTPATIENT
Start: 2022-04-11

## 2022-04-11 RX ORDER — AMLODIPINE BESYLATE 10 MG/1
10 TABLET ORAL DAILY
Qty: 90 TABLET | Refills: 1 | Status: CANCELLED | OUTPATIENT
Start: 2022-04-11

## 2022-04-11 RX ORDER — MELOXICAM 7.5 MG/1
7.5 TABLET ORAL DAILY
Qty: 90 TABLET | Refills: 1 | Status: CANCELLED | OUTPATIENT
Start: 2022-04-11

## 2022-04-11 RX ORDER — LORATADINE 10 MG/1
TABLET ORAL
Qty: 90 TABLET | Refills: 1 | Status: CANCELLED | OUTPATIENT
Start: 2022-04-11

## 2022-04-11 NOTE — TELEPHONE ENCOUNTER
Amlodipine 10 mg tablet-take 1 tablet by mouth daily    Atorvastatin 20 mg tablet-take 1 tablet by mouth every day    Loratadine 10 mg tablet-Take 1 by mouth every day in the spring    Meloxicam 7 5 mg tablet-take 1 tablet by mouth daily    90 DAY REFILL    [facility]

## 2022-04-21 NOTE — TELEPHONE ENCOUNTER
Spoke with the patient and advised him to have labs drawn  He verbalized understanding  He scheduled a follow up appointment with Dr Calvin Simmons for 4/28

## 2022-04-22 ENCOUNTER — RA CDI HCC (OUTPATIENT)
Dept: OTHER | Facility: HOSPITAL | Age: 57
End: 2022-04-22

## 2022-04-22 NOTE — PROGRESS NOTES
[facility]  coding opportunities          Chart Reviewed number of suggestions sent to Provider: 1     Based on clinical documentation indicated in your record, it appears that the patient may have the following conditions:    E66 01- Severe obesity with BMI over 35 and comorbid condition(HCC)  *hypertension and BMI of 36 48    If this is correct, please document and assess at your next visit    4/28/22    Patients Insurance        Commercial Insurance: [employer]

## 2022-04-25 ENCOUNTER — APPOINTMENT (OUTPATIENT)
Dept: LAB | Facility: HOSPITAL | Age: 57
End: 2022-04-25
Payer: COMMERCIAL

## 2022-04-25 DIAGNOSIS — Z11.59 ENCOUNTER FOR HEPATITIS C SCREENING TEST FOR LOW RISK PATIENT: ICD-10-CM

## 2022-04-25 DIAGNOSIS — Z11.4 ENCOUNTER FOR SCREENING FOR HIV: ICD-10-CM

## 2022-04-25 DIAGNOSIS — I10 ESSENTIAL HYPERTENSION: ICD-10-CM

## 2022-04-25 DIAGNOSIS — E78.5 HYPERLIPIDEMIA, UNSPECIFIED HYPERLIPIDEMIA TYPE: ICD-10-CM

## 2022-04-25 DIAGNOSIS — Z12.5 PROSTATE CANCER SCREENING: ICD-10-CM

## 2022-04-25 DIAGNOSIS — Z13.6 ENCOUNTER FOR SCREENING FOR CARDIOVASCULAR DISORDERS: ICD-10-CM

## 2022-04-25 LAB
ALBUMIN SERPL BCP-MCNC: 3.9 G/DL (ref 3.5–5)
ALP SERPL-CCNC: 77 U/L (ref 46–116)
ALT SERPL W P-5'-P-CCNC: 71 U/L (ref 12–78)
ANION GAP SERPL CALCULATED.3IONS-SCNC: 9 MMOL/L (ref 4–13)
AST SERPL W P-5'-P-CCNC: 28 U/L (ref 5–45)
BASOPHILS # BLD AUTO: 0.04 THOUSANDS/ΜL (ref 0–0.1)
BASOPHILS NFR BLD AUTO: 1 % (ref 0–1)
BILIRUB SERPL-MCNC: 0.38 MG/DL (ref 0.2–1)
BUN SERPL-MCNC: 16 MG/DL (ref 5–25)
CALCIUM SERPL-MCNC: 9.3 MG/DL (ref 8.3–10.1)
CHLORIDE SERPL-SCNC: 108 MMOL/L (ref 100–108)
CHOLEST SERPL-MCNC: 183 MG/DL
CO2 SERPL-SCNC: 28 MMOL/L (ref 21–32)
CREAT SERPL-MCNC: 1.2 MG/DL (ref 0.6–1.3)
EOSINOPHIL # BLD AUTO: 0.16 THOUSAND/ΜL (ref 0–0.61)
EOSINOPHIL NFR BLD AUTO: 3 % (ref 0–6)
ERYTHROCYTE [DISTWIDTH] IN BLOOD BY AUTOMATED COUNT: 12.1 % (ref 11.6–15.1)
EST. AVERAGE GLUCOSE BLD GHB EST-MCNC: 120 MG/DL
GFR SERPL CREATININE-BSD FRML MDRD: 66 ML/MIN/1.73SQ M
GLUCOSE SERPL-MCNC: 121 MG/DL (ref 65–140)
HBA1C MFR BLD: 5.8 %
HCT VFR BLD AUTO: 46.6 % (ref 36.5–49.3)
HDLC SERPL-MCNC: 39 MG/DL
HGB BLD-MCNC: 15.8 G/DL (ref 12–17)
IMM GRANULOCYTES # BLD AUTO: 0.01 THOUSAND/UL (ref 0–0.2)
IMM GRANULOCYTES NFR BLD AUTO: 0 % (ref 0–2)
LDLC SERPL CALC-MCNC: 107 MG/DL (ref 0–100)
LYMPHOCYTES # BLD AUTO: 2.24 THOUSANDS/ΜL (ref 0.6–4.47)
LYMPHOCYTES NFR BLD AUTO: 38 % (ref 14–44)
MCH RBC QN AUTO: 32.6 PG (ref 26.8–34.3)
MCHC RBC AUTO-ENTMCNC: 33.9 G/DL (ref 31.4–37.4)
MCV RBC AUTO: 96 FL (ref 82–98)
MONOCYTES # BLD AUTO: 0.35 THOUSAND/ΜL (ref 0.17–1.22)
MONOCYTES NFR BLD AUTO: 6 % (ref 4–12)
NEUTROPHILS # BLD AUTO: 3.13 THOUSANDS/ΜL (ref 1.85–7.62)
NEUTS SEG NFR BLD AUTO: 52 % (ref 43–75)
NRBC BLD AUTO-RTO: 0 /100 WBCS
PLATELET # BLD AUTO: 207 THOUSANDS/UL (ref 149–390)
PMV BLD AUTO: 10.7 FL (ref 8.9–12.7)
POTASSIUM SERPL-SCNC: 4 MMOL/L (ref 3.5–5.3)
PROT SERPL-MCNC: 7.3 G/DL (ref 6.4–8.2)
RBC # BLD AUTO: 4.85 MILLION/UL (ref 3.88–5.62)
SODIUM SERPL-SCNC: 145 MMOL/L (ref 136–145)
TRIGL SERPL-MCNC: 183 MG/DL
WBC # BLD AUTO: 5.93 THOUSAND/UL (ref 4.31–10.16)

## 2022-04-25 PROCEDURE — 85025 COMPLETE CBC W/AUTO DIFF WBC: CPT

## 2022-04-25 PROCEDURE — 87389 HIV-1 AG W/HIV-1&-2 AB AG IA: CPT

## 2022-04-25 PROCEDURE — 86803 HEPATITIS C AB TEST: CPT

## 2022-04-25 PROCEDURE — 80053 COMPREHEN METABOLIC PANEL: CPT

## 2022-04-25 PROCEDURE — 84154 ASSAY OF PSA FREE: CPT

## 2022-04-25 PROCEDURE — 84153 ASSAY OF PSA TOTAL: CPT

## 2022-04-25 PROCEDURE — 80061 LIPID PANEL: CPT

## 2022-04-25 PROCEDURE — 83036 HEMOGLOBIN GLYCOSYLATED A1C: CPT

## 2022-04-25 PROCEDURE — 36415 COLL VENOUS BLD VENIPUNCTURE: CPT

## 2022-04-26 LAB
HCV AB SER QL: NORMAL
HIV 1+2 AB+HIV1 P24 AG SERPL QL IA: NORMAL

## 2022-04-27 LAB
PSA FREE MFR SERPL: 18.3 %
PSA FREE SERPL-MCNC: 0.42 NG/ML
PSA SERPL-MCNC: 2.3 NG/ML (ref 0–4)

## 2022-04-28 ENCOUNTER — OFFICE VISIT (OUTPATIENT)
Dept: INTERNAL MEDICINE CLINIC | Facility: CLINIC | Age: 57
End: 2022-04-28
Payer: COMMERCIAL

## 2022-04-28 VITALS
BODY MASS INDEX: 37.38 KG/M2 | HEART RATE: 76 BPM | HEIGHT: 72 IN | OXYGEN SATURATION: 96 % | WEIGHT: 276 LBS | SYSTOLIC BLOOD PRESSURE: 134 MMHG | DIASTOLIC BLOOD PRESSURE: 84 MMHG | TEMPERATURE: 99.2 F

## 2022-04-28 DIAGNOSIS — R21 RASH: ICD-10-CM

## 2022-04-28 DIAGNOSIS — E78.5 HYPERLIPIDEMIA, UNSPECIFIED HYPERLIPIDEMIA TYPE: ICD-10-CM

## 2022-04-28 DIAGNOSIS — I10 ESSENTIAL HYPERTENSION: ICD-10-CM

## 2022-04-28 DIAGNOSIS — J01.10 ACUTE NON-RECURRENT FRONTAL SINUSITIS: ICD-10-CM

## 2022-04-28 DIAGNOSIS — Z12.11 SCREENING FOR MALIGNANT NEOPLASM OF COLON: ICD-10-CM

## 2022-04-28 DIAGNOSIS — Z12.5 PROSTATE CANCER SCREENING: ICD-10-CM

## 2022-04-28 DIAGNOSIS — I10 PRIMARY HYPERTENSION: Primary | ICD-10-CM

## 2022-04-28 DIAGNOSIS — Z12.11 ENCOUNTER FOR SCREENING COLONOSCOPY: ICD-10-CM

## 2022-04-28 DIAGNOSIS — I10 HYPERTENSION, UNSPECIFIED TYPE: ICD-10-CM

## 2022-04-28 PROCEDURE — 3008F BODY MASS INDEX DOCD: CPT | Performed by: INTERNAL MEDICINE

## 2022-04-28 PROCEDURE — 3075F SYST BP GE 130 - 139MM HG: CPT | Performed by: INTERNAL MEDICINE

## 2022-04-28 PROCEDURE — 1036F TOBACCO NON-USER: CPT | Performed by: INTERNAL MEDICINE

## 2022-04-28 PROCEDURE — 3725F SCREEN DEPRESSION PERFORMED: CPT | Performed by: INTERNAL MEDICINE

## 2022-04-28 PROCEDURE — 3079F DIAST BP 80-89 MM HG: CPT | Performed by: INTERNAL MEDICINE

## 2022-04-28 PROCEDURE — 99214 OFFICE O/P EST MOD 30 MIN: CPT | Performed by: INTERNAL MEDICINE

## 2022-04-28 RX ORDER — LORATADINE 10 MG/1
TABLET ORAL
Qty: 100 TABLET | Refills: 1 | Status: SHIPPED | OUTPATIENT
Start: 2022-04-28

## 2022-04-28 RX ORDER — BETAMETHASONE DIPROPIONATE 0.5 MG/G
LOTION TOPICAL 2 TIMES DAILY
Qty: 60 ML | Refills: 0 | Status: SHIPPED | OUTPATIENT
Start: 2022-04-28

## 2022-04-28 RX ORDER — AMLODIPINE BESYLATE 10 MG/1
10 TABLET ORAL DAILY
Qty: 90 TABLET | Refills: 2 | Status: SHIPPED | OUTPATIENT
Start: 2022-04-28

## 2022-04-28 RX ORDER — ATORVASTATIN CALCIUM 20 MG/1
20 TABLET, FILM COATED ORAL DAILY
Qty: 90 TABLET | Refills: 2 | Status: SHIPPED | OUTPATIENT
Start: 2022-04-28

## 2022-04-28 RX ORDER — LISINOPRIL 40 MG/1
40 TABLET ORAL DAILY
Qty: 90 TABLET | Refills: 2 | Status: SHIPPED | OUTPATIENT
Start: 2022-04-28

## 2022-04-28 RX ORDER — METOPROLOL SUCCINATE 100 MG/1
100 TABLET, EXTENDED RELEASE ORAL DAILY
Qty: 90 TABLET | Refills: 2 | Status: SHIPPED | OUTPATIENT
Start: 2022-04-28 | End: 2022-05-23 | Stop reason: SDUPTHER

## 2022-04-28 NOTE — PROGRESS NOTES
Assessment/Plan:     Paulette Barraza is doing well  Denies any complaints except for seasonal allergies  Would like refills  We discussed his labs  We will keep an eye on his BP  Open to colonoscopy  Refusing vaccines  Sent Claritin for allergic rhinitis  Quality Measures:     BMI Counseling: Body mass index is 37 43 kg/m²  The BMI is above normal  Nutrition recommendations include decreasing portion sizes and encouraging healthy choices of fruits and vegetables  Exercise recommendations include moderate physical activity 150 minutes/week  Rationale for BMI follow-up plan is due to patient being overweight or obese  Depression Screening and Follow-up Plan: Patient was screened for depression during today's encounter  They screened negative with a PHQ-2 score of 0  Return in about 6 months (around 10/28/2022)  No problem-specific Assessment & Plan notes found for this encounter  Diagnoses and all orders for this visit:    Primary hypertension  -     CBC and differential; Future  -     Comprehensive metabolic panel; Future  -     Hemoglobin A1C; Future    Hyperlipidemia, unspecified hyperlipidemia type  -     CBC and differential; Future  -     Lipid Panel with Direct LDL reflex; Future  -     Hemoglobin A1C; Future  -     atorvastatin (LIPITOR) 20 mg tablet; Take 1 tablet (20 mg total) by mouth daily    Prostate cancer screening  -     PSA, total and free; Future    Screening for malignant neoplasm of colon  -     Cancel: Ambulatory Referral to Gastroenterology; Future    Hypertension, unspecified type  -     metoprolol succinate (TOPROL-XL) 100 mg 24 hr tablet; Take 1 tablet (100 mg total) by mouth daily  -     lisinopril (ZESTRIL) 40 mg tablet; Take 1 tablet (40 mg total) by mouth daily    Acute non-recurrent frontal sinusitis  -     loratadine (CLARITIN) 10 mg tablet; 1 BY MOUTH EVERY DAY IN [facility]    Essential hypertension  -     amLODIPine (NORVASC) 10 mg tablet;  Take 1 tablet (10 mg total) by mouth daily    Encounter for screening colonoscopy  -     Ambulatory referral for colonoscopy; Future          Subjective:      Patient ID: Jd Mendoza is a 60 y o  male  Ira Kaba is here for routine follow up  ALLERGIES:  No Known Allergies    CURRENT MEDICATIONS:    Current Outpatient Medications:     amLODIPine (NORVASC) 10 mg tablet, Take 1 tablet (10 mg total) by mouth daily, Disp: 90 tablet, Rfl: 2    atorvastatin (LIPITOR) 20 mg tablet, Take 1 tablet (20 mg total) by mouth daily, Disp: 90 tablet, Rfl: 2    lisinopril (ZESTRIL) 40 mg tablet, Take 1 tablet (40 mg total) by mouth daily, Disp: 90 tablet, Rfl: 2    meloxicam (MOBIC) 7 5 mg tablet, Take 1 tablet (7 5 mg total) by mouth daily, Disp: 30 tablet, Rfl: 5    metoprolol succinate (TOPROL-XL) 100 mg 24 hr tablet, Take 1 tablet (100 mg total) by mouth daily, Disp: 90 tablet, Rfl: 2    loratadine (CLARITIN) 10 mg tablet, 1 BY MOUTH EVERY DAY IN [place], Disp: 100 tablet, Rfl: 1    ACTIVE PROBLEM LIST:  Patient Active Problem List   Diagnosis    Arthralgia    Edema    Elevated ALT measurement    Erectile dysfunction of non-organic origin    Esophageal reflux    Generalized anxiety disorder    Gout    Hyperlipidemia    Hypertension    Low back pain    Lumbar radiculopathy    Myalgia    Abnormal radiographic examination    Obesity    Lumbar pain    Polycythemia    Rash    Right foot pain    Viral syndrome    COVID-19       PAST MEDICAL HISTORY:  Past Medical History:   Diagnosis Date    Hyperlipemia     Hypertension        PAST SURGICAL HISTORY:  History reviewed  No pertinent surgical history      FAMILY HISTORY:  Family History   Problem Relation Age of Onset   Willena Gazelle Cancer Mother     Aneurysm Mother     Heart disease Father     Coronary artery disease Father     Diabetes Family        SOCIAL HISTORY:  Social History     Socioeconomic History    Marital status: Single     Spouse name: Not on file    Number of children: Not on file    Years of education: Not on file    Highest education level: Not on file   Occupational History    Not on file   Tobacco Use    Smoking status: Never Smoker    Smokeless tobacco: Never Used   Substance and Sexual Activity    Alcohol use: Yes     Comment: patient states he drinks qod    Drug use: No    Sexual activity: Not Currently   Other Topics Concern    Not on file   Social History Narrative    Not on file     Social Determinants of Health     Financial Resource Strain: Not on file   Food Insecurity: Not on file   Transportation Needs: Not on file   Physical Activity: Not on file   Stress: Not on file   Social Connections: Not on file   Intimate Partner Violence: Not on file   Housing Stability: Not on file       Review of Systems   HENT: Positive for postnasal drip  Respiratory: Positive for cough  Musculoskeletal: Positive for arthralgias  Right foot pain   All other systems reviewed and are negative  Objective:  Vitals:    04/28/22 1040   BP: 134/84   BP Location: Left arm   Patient Position: Sitting   Cuff Size: Large   Pulse: 76   Temp: 99 2 °F (37 3 °C)   SpO2: 96%   Weight: 125 kg (276 lb)   Height: 6' (1 829 m)     Body mass index is 37 43 kg/m²  Physical Exam  Vitals and nursing note reviewed  Constitutional:       Appearance: Normal appearance  He is obese  HENT:      Head: Normocephalic and atraumatic  Right Ear: Tympanic membrane normal       Left Ear: Tympanic membrane normal       Nose: Nose normal       Mouth/Throat:      Mouth: Mucous membranes are moist       Pharynx: Oropharynx is clear  Cardiovascular:      Rate and Rhythm: Normal rate and regular rhythm  Heart sounds: Normal heart sounds  Pulmonary:      Breath sounds: Normal breath sounds  Abdominal:      Palpations: Abdomen is soft  Musculoskeletal:         General: Normal range of motion  Cervical back: Normal range of motion  Right lower leg: No edema  Left lower leg: No edema  Skin:     General: Skin is warm and dry  Neurological:      General: No focal deficit present  Mental Status: He is alert and oriented to person, place, and time  Mental status is at baseline     Psychiatric:         Mood and Affect: Mood normal            RESULTS:    Recent Results (from the past 1008 hour(s))   CBC and differential    Collection Time: 04/25/22  2:27 PM   Result Value Ref Range    WBC 5 93 4 31 - 10 16 Thousand/uL    RBC 4 85 3 88 - 5 62 Million/uL    Hemoglobin 15 8 12 0 - 17 0 g/dL    Hematocrit 46 6 36 5 - 49 3 %    MCV 96 82 - 98 fL    MCH 32 6 26 8 - 34 3 pg    MCHC 33 9 31 4 - 37 4 g/dL    RDW 12 1 11 6 - 15 1 %    MPV 10 7 8 9 - 12 7 fL    Platelets 676 064 - 391 Thousands/uL    nRBC 0 /100 WBCs    Neutrophils Relative 52 43 - 75 %    Immat GRANS % 0 0 - 2 %    Lymphocytes Relative 38 14 - 44 %    Monocytes Relative 6 4 - 12 %    Eosinophils Relative 3 0 - 6 %    Basophils Relative 1 0 - 1 %    Neutrophils Absolute 3 13 1 85 - 7 62 Thousands/µL    Immature Grans Absolute 0 01 0 00 - 0 20 Thousand/uL    Lymphocytes Absolute 2 24 0 60 - 4 47 Thousands/µL    Monocytes Absolute 0 35 0 17 - 1 22 Thousand/µL    Eosinophils Absolute 0 16 0 00 - 0 61 Thousand/µL    Basophils Absolute 0 04 0 00 - 0 10 Thousands/µL   Comprehensive metabolic panel    Collection Time: 04/25/22  2:27 PM   Result Value Ref Range    Sodium 145 136 - 145 mmol/L    Potassium 4 0 3 5 - 5 3 mmol/L    Chloride 108 100 - 108 mmol/L    CO2 28 21 - 32 mmol/L    ANION GAP 9 4 - 13 mmol/L    BUN 16 5 - 25 mg/dL    Creatinine 1 20 0 60 - 1 30 mg/dL    Glucose 121 65 - 140 mg/dL    Calcium 9 3 8 3 - 10 1 mg/dL    AST 28 5 - 45 U/L    ALT 71 12 - 78 U/L    Alkaline Phosphatase 77 46 - 116 U/L    Total Protein 7 3 6 4 - 8 2 g/dL    Albumin 3 9 3 5 - 5 0 g/dL    Total Bilirubin 0 38 0 20 - 1 00 mg/dL    eGFR 66 ml/min/1 73sq m   Lipid Panel with Direct LDL reflex    Collection Time: 04/25/22  2:27 PM Result Value Ref Range    Cholesterol 183 See Comment mg/dL    Triglycerides 183 (H) See Comment mg/dL    HDL, Direct 39 (L) >=40 mg/dL    LDL Calculated 107 (H) 0 - 100 mg/dL   Hemoglobin A1C    Collection Time: 04/25/22  2:27 PM   Result Value Ref Range    Hemoglobin A1C 5 8 (H) Normal 3 8-5 6%; PreDiabetic 5 7-6 4%; Diabetic >=6 5%; Glycemic control for adults with diabetes <7 0% %     mg/dl   PSA, total and free    Collection Time: 04/25/22  2:27 PM   Result Value Ref Range    Prostate Specific Antigen Total 2 3 0 0 - 4 0 ng/mL    PSA, Free 0 42 N/A ng/mL    PSA, Free Pct 18 3 %   HIV 1/2 Antigen/Antibody (4th Generation) w Reflex [facility]    Collection Time: 04/25/22  2:27 PM   Result Value Ref Range    HIV-1/HIV-2 Ab Non-Reactive Non-Reactive   Hepatitis C antibody    Collection Time: 04/25/22  2:27 PM   Result Value Ref Range    Hepatitis C Ab Non-reactive Non-reactive       This note was created with voice recognition software  Phonic, grammatical and spelling errors may be present within the note as a result

## 2022-05-23 DIAGNOSIS — I10 HYPERTENSION, UNSPECIFIED TYPE: ICD-10-CM

## 2022-05-23 RX ORDER — METOPROLOL SUCCINATE 100 MG/1
100 TABLET, EXTENDED RELEASE ORAL DAILY
Qty: 90 TABLET | Refills: 2 | Status: SHIPPED | OUTPATIENT
Start: 2022-05-23

## 2022-05-23 NOTE — TELEPHONE ENCOUNTER
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

## 2022-05-23 NOTE — TELEPHONE ENCOUNTER
Metoprolol Succinate 100 mg 24 hr tablet-take 1 tablet by mouth daily    [facility]      Patient has a refill on his script but the pharmacy said that they wouldn't  Refill with out a doctors order      Patient gets a 90 day supply w/refills

## 2022-11-08 DIAGNOSIS — J01.10 ACUTE NON-RECURRENT FRONTAL SINUSITIS: ICD-10-CM

## 2022-11-08 RX ORDER — LORATADINE 10 MG/1
TABLET ORAL
Qty: 100 TABLET | Refills: 1 | Status: SHIPPED | OUTPATIENT
Start: 2022-11-08

## 2022-11-28 DIAGNOSIS — E78.5 HYPERLIPIDEMIA, UNSPECIFIED HYPERLIPIDEMIA TYPE: ICD-10-CM

## 2022-11-28 DIAGNOSIS — I10 HYPERTENSION, UNSPECIFIED TYPE: ICD-10-CM

## 2022-11-28 DIAGNOSIS — J01.10 ACUTE NON-RECURRENT FRONTAL SINUSITIS: ICD-10-CM

## 2022-11-28 DIAGNOSIS — I10 ESSENTIAL HYPERTENSION: ICD-10-CM

## 2022-11-28 RX ORDER — ATORVASTATIN CALCIUM 20 MG/1
20 TABLET, FILM COATED ORAL DAILY
Qty: 90 TABLET | Refills: 3 | Status: SHIPPED | OUTPATIENT
Start: 2022-11-28

## 2022-11-28 RX ORDER — METOPROLOL SUCCINATE 100 MG/1
100 TABLET, EXTENDED RELEASE ORAL DAILY
Qty: 90 TABLET | Refills: 3 | Status: SHIPPED | OUTPATIENT
Start: 2022-11-28

## 2022-11-28 RX ORDER — LORATADINE 10 MG/1
TABLET ORAL
Qty: 90 TABLET | Refills: 3 | Status: SHIPPED | OUTPATIENT
Start: 2022-11-28

## 2022-11-28 RX ORDER — AMLODIPINE BESYLATE 10 MG/1
10 TABLET ORAL DAILY
Qty: 90 TABLET | Refills: 3 | Status: SHIPPED | OUTPATIENT
Start: 2022-11-28

## 2022-11-28 RX ORDER — LISINOPRIL 40 MG/1
40 TABLET ORAL DAILY
Qty: 90 TABLET | Refills: 3 | Status: SHIPPED | OUTPATIENT
Start: 2022-11-28

## 2022-11-28 NOTE — TELEPHONE ENCOUNTER
Amlodipine 10 mg tablet-take 1 tablet by mouth daily    Atorvastatin 20 mg tablet-take 1 tablet by mouth daily    Lisinopril 40 mg tablet-take 1 tablet by mouth daily    Metoprolol succinate 100 mg 24 hour tablet-take 1 tablet by mouth in the morning    Loratadine 10 mg tablet- 1 by mouth every day in the spring    [facility] [place]    Patient wants all refills be sent into the pharmacy with 3 month supply

## 2023-01-23 ENCOUNTER — OFFICE VISIT (OUTPATIENT)
Dept: INTERNAL MEDICINE CLINIC | Facility: CLINIC | Age: 58
End: 2023-01-23

## 2023-01-23 VITALS
HEIGHT: 72 IN | HEART RATE: 64 BPM | OXYGEN SATURATION: 94 % | BODY MASS INDEX: 36.44 KG/M2 | DIASTOLIC BLOOD PRESSURE: 80 MMHG | SYSTOLIC BLOOD PRESSURE: 130 MMHG | WEIGHT: 269 LBS | RESPIRATION RATE: 16 BRPM

## 2023-01-23 DIAGNOSIS — M19.049 HAND ARTHRITIS: ICD-10-CM

## 2023-01-23 DIAGNOSIS — H61.23 BILATERAL IMPACTED CERUMEN: Primary | ICD-10-CM

## 2023-01-23 DIAGNOSIS — D22.9 CHANGING NEVUS: ICD-10-CM

## 2023-01-23 DIAGNOSIS — Z12.11 COLON CANCER SCREENING: ICD-10-CM

## 2023-01-23 PROBLEM — M79.671 RIGHT FOOT PAIN: Status: RESOLVED | Noted: 2021-03-03 | Resolved: 2023-01-23

## 2023-01-23 PROBLEM — B34.9 VIRAL SYNDROME: Status: RESOLVED | Noted: 2021-12-13 | Resolved: 2023-01-23

## 2023-01-23 PROBLEM — U07.1 COVID-19: Status: RESOLVED | Noted: 2021-12-15 | Resolved: 2023-01-23

## 2023-01-23 PROBLEM — M54.50 LOW BACK PAIN: Status: RESOLVED | Noted: 2017-04-12 | Resolved: 2023-01-23

## 2023-01-23 PROBLEM — R21 RASH: Status: RESOLVED | Noted: 2021-03-03 | Resolved: 2023-01-23

## 2023-01-23 NOTE — PROGRESS NOTES
Assessment/Plan:     Here with b/l ear pain; b/l cerumen impaction noted; he was instructed to use debrox and then I can try to irrigate them  Has a suspicious changing mole to his right chest wall; will have him see dedicated dermatology; Needs a colonoscopy which will be his first screening  Reports b/l hand arthritis; try diclofenac cream; does not like tylenol  Quality Measures:     BMI Counseling: There is no height or weight on file to calculate BMI  The BMI is above normal  Nutrition recommendations include decreasing portion sizes and encouraging healthy choices of fruits and vegetables  Exercise recommendations include moderate physical activity 150 minutes/week  Rationale for BMI follow-up plan is due to patient being overweight or obese  Depression Screening and Follow-up Plan: Clincally patient does not have depression  No treatment is required  Return in about 3 months (around 4/23/2023)  No problem-specific Assessment & Plan notes found for this encounter  Diagnoses and all orders for this visit:    Bilateral impacted cerumen  -     carbamide peroxide (DEBROX) 6 5 % otic solution; Administer 5 drops into both ears 2 (two) times a day for 5 days    Changing nevus  -     Ambulatory Referral to Dermatology; Future    Hand arthritis  -     Diclofenac Sodium (VOLTAREN) 1 %; Apply 2 g topically 4 (four) times a day    Colon cancer screening  -     Ambulatory referral for colonoscopy; Future          Subjective:      Patient ID: Una Chun is a 59 y o  male  Here with ear pain        ALLERGIES:  No Known Allergies    CURRENT MEDICATIONS:    Current Outpatient Medications:   •  amLODIPine (NORVASC) 10 mg tablet, Take 1 tablet (10 mg total) by mouth daily, Disp: 90 tablet, Rfl: 3  •  atorvastatin (LIPITOR) 20 mg tablet, Take 1 tablet (20 mg total) by mouth daily, Disp: 90 tablet, Rfl: 3  •  betamethasone dipropionate 0 05 % lotion, Apply topically 2 (two) times a day, Disp: 60 mL, Rfl: 0  •  carbamide peroxide (DEBROX) 6 5 % otic solution, Administer 5 drops into both ears 2 (two) times a day for 5 days, Disp: 15 mL, Rfl: 0  •  Diclofenac Sodium (VOLTAREN) 1 %, Apply 2 g topically 4 (four) times a day, Disp: 350 g, Rfl: 0  •  lisinopril (ZESTRIL) 40 mg tablet, Take 1 tablet (40 mg total) by mouth daily, Disp: 90 tablet, Rfl: 3  •  loratadine (CLARITIN) 10 mg tablet, 1 BY MOUTH EVERY DAY IN [place], Disp: 90 tablet, Rfl: 3  •  meloxicam (MOBIC) 7 5 mg tablet, Take 1 tablet (7 5 mg total) by mouth daily, Disp: 30 tablet, Rfl: 5  •  metoprolol succinate (TOPROL-XL) 100 mg 24 hr tablet, Take 1 tablet (100 mg total) by mouth daily, Disp: 90 tablet, Rfl: 3    ACTIVE PROBLEM LIST:  Patient Active Problem List   Diagnosis   • Arthralgia   • Erectile dysfunction of non-organic origin   • Esophageal reflux   • Generalized anxiety disorder   • Gout   • Hyperlipidemia   • Hypertension   • Lumbar radiculopathy   • Abnormal radiographic examination   • Obesity   • Lumbar pain   • Polycythemia       PAST MEDICAL HISTORY:  Past Medical History:   Diagnosis Date   • Hyperlipemia    • Hypertension        PAST SURGICAL HISTORY:  History reviewed  No pertinent surgical history      FAMILY HISTORY:  Family History   Problem Relation Age of Onset   • Cancer Mother    • Aneurysm Mother    • Heart disease Father    • Coronary artery disease Father    • Diabetes Family        SOCIAL HISTORY:  Social History     Socioeconomic History   • Marital status: Single     Spouse name: Not on file   • Number of children: Not on file   • Years of education: Not on file   • Highest education level: Not on file   Occupational History   • Not on file   Tobacco Use   • Smoking status: Never   • Smokeless tobacco: Never   Vaping Use   • Vaping Use: Never used   Substance and Sexual Activity   • Alcohol use: Yes     Comment: patient states he drinks qod   • Drug use: No   • Sexual activity: Not Currently   Other Topics Concern   • Not on file   Social History Narrative   • Not on file     Social Determinants of Health     Financial Resource Strain: Not on file   Food Insecurity: Not on file   Transportation Needs: Not on file   Physical Activity: Not on file   Stress: Not on file   Social Connections: Not on file   Intimate Partner Violence: Not on file   Housing Stability: Not on file       Review of Systems   HENT: Positive for ear pain and hearing loss  Musculoskeletal: Positive for arthralgias  Skin: Positive for color change  All other systems reviewed and are negative  Objective:  Vitals:    01/23/23 1551   BP: 130/80   BP Location: Left arm   Patient Position: Sitting   Pulse: 64   Resp: 16   SpO2: 94%   Weight: 122 kg (269 lb)   Height: 6' (1 829 m)     Body mass index is 36 48 kg/m²  Physical Exam  Vitals and nursing note reviewed  Constitutional:       Appearance: Normal appearance  He is obese  HENT:      Head: Normocephalic and atraumatic  Right Ear: There is impacted cerumen  Left Ear: There is impacted cerumen  Cardiovascular:      Rate and Rhythm: Normal rate  Pulmonary:      Effort: Pulmonary effort is normal    Musculoskeletal:         General: Normal range of motion  Cervical back: Normal range of motion  Skin:     General: Skin is dry  Findings: Lesion present  Neurological:      General: No focal deficit present  Mental Status: He is alert and oriented to person, place, and time  Psychiatric:         Mood and Affect: Mood normal            RESULTS:    No results found for this or any previous visit (from the past 1008 hour(s))  This note was created with voice recognition software  Phonic, grammatical and spelling errors may be present within the note as a result

## 2023-03-29 ENCOUNTER — APPOINTMENT (EMERGENCY)
Dept: CT IMAGING | Facility: HOSPITAL | Age: 58
End: 2023-03-29

## 2023-03-29 ENCOUNTER — HOSPITAL ENCOUNTER (EMERGENCY)
Facility: HOSPITAL | Age: 58
Discharge: HOME | End: 2023-03-29
Attending: EMERGENCY MEDICINE

## 2023-03-29 VITALS
SYSTOLIC BLOOD PRESSURE: 136 MMHG | OXYGEN SATURATION: 97 % | DIASTOLIC BLOOD PRESSURE: 69 MMHG | RESPIRATION RATE: 18 BRPM | TEMPERATURE: 97 F | HEART RATE: 65 BPM

## 2023-03-29 DIAGNOSIS — K63.89 EPIPLOIC APPENDAGITIS: Primary | ICD-10-CM

## 2023-03-29 LAB
ALBUMIN SERPL BCP-MCNC: 4.2 G/DL (ref 3.5–5)
ALP SERPL-CCNC: 69 U/L (ref 34–104)
ALT SERPL W P-5'-P-CCNC: 43 U/L (ref 7–52)
ANION GAP SERPL CALCULATED.3IONS-SCNC: 8 MMOL/L (ref 4–13)
AST SERPL W P-5'-P-CCNC: 25 U/L (ref 13–39)
BASOPHILS # BLD AUTO: 0.04 THOUSANDS/ÂΜL (ref 0–0.1)
BASOPHILS NFR BLD AUTO: 1 % (ref 0–1)
BILIRUB SERPL-MCNC: 0.66 MG/DL (ref 0.2–1)
BILIRUB UR QL STRIP: NEGATIVE
BUN SERPL-MCNC: 11 MG/DL (ref 5–25)
CALCIUM SERPL-MCNC: 9.1 MG/DL (ref 8.4–10.2)
CHLORIDE SERPL-SCNC: 105 MMOL/L (ref 96–108)
CLARITY UR: CLEAR
CO2 SERPL-SCNC: 26 MMOL/L (ref 21–32)
COLOR UR: COLORLESS
CREAT SERPL-MCNC: 0.95 MG/DL (ref 0.6–1.3)
EOSINOPHIL # BLD AUTO: 0.19 THOUSAND/ÂΜL (ref 0–0.61)
EOSINOPHIL NFR BLD AUTO: 3 % (ref 0–6)
ERYTHROCYTE [DISTWIDTH] IN BLOOD BY AUTOMATED COUNT: 12.7 % (ref 11.6–15.1)
GFR SERPL CREATININE-BSD FRML MDRD: 87 ML/MIN/1.73SQ M
GLUCOSE SERPL-MCNC: 130 MG/DL (ref 65–140)
GLUCOSE UR STRIP-MCNC: NEGATIVE MG/DL
HCT VFR BLD AUTO: 46.1 % (ref 36.5–49.3)
HGB BLD-MCNC: 15.4 G/DL (ref 12–17)
HGB UR QL STRIP.AUTO: NEGATIVE
IMM GRANULOCYTES # BLD AUTO: 0.01 THOUSAND/UL (ref 0–0.2)
IMM GRANULOCYTES NFR BLD AUTO: 0 % (ref 0–2)
KETONES UR STRIP-MCNC: NEGATIVE MG/DL
LEUKOCYTE ESTERASE UR QL STRIP: NEGATIVE
LIPASE SERPL-CCNC: 12 U/L (ref 11–82)
LYMPHOCYTES # BLD AUTO: 2.07 THOUSANDS/ÂΜL (ref 0.6–4.47)
LYMPHOCYTES NFR BLD AUTO: 31 % (ref 14–44)
MCH RBC QN AUTO: 32 PG (ref 26.8–34.3)
MCHC RBC AUTO-ENTMCNC: 33.4 G/DL (ref 31.4–37.4)
MCV RBC AUTO: 96 FL (ref 82–98)
MONOCYTES # BLD AUTO: 0.4 THOUSAND/ÂΜL (ref 0.17–1.22)
MONOCYTES NFR BLD AUTO: 6 % (ref 4–12)
NEUTROPHILS # BLD AUTO: 4.06 THOUSANDS/ÂΜL (ref 1.85–7.62)
NEUTS SEG NFR BLD AUTO: 59 % (ref 43–75)
NITRITE UR QL STRIP: NEGATIVE
NRBC BLD AUTO-RTO: 0 /100 WBCS
PH UR STRIP.AUTO: 6.5 [PH]
PLATELET # BLD AUTO: 209 THOUSANDS/UL (ref 149–390)
PMV BLD AUTO: 10.9 FL (ref 8.9–12.7)
POTASSIUM SERPL-SCNC: 3.7 MMOL/L (ref 3.5–5.3)
PROT SERPL-MCNC: 6.8 G/DL (ref 6.4–8.4)
PROT UR STRIP-MCNC: NEGATIVE MG/DL
RBC # BLD AUTO: 4.81 MILLION/UL (ref 3.88–5.62)
SODIUM SERPL-SCNC: 139 MMOL/L (ref 135–147)
SP GR UR STRIP.AUTO: 1.01 (ref 1–1.03)
UROBILINOGEN UR STRIP-ACNC: <2 MG/DL
WBC # BLD AUTO: 6.77 THOUSAND/UL (ref 4.31–10.16)

## 2023-03-29 RX ORDER — KETOROLAC TROMETHAMINE 30 MG/ML
15 INJECTION, SOLUTION INTRAMUSCULAR; INTRAVENOUS ONCE
Status: COMPLETED | OUTPATIENT
Start: 2023-03-29 | End: 2023-03-29

## 2023-03-29 RX ADMIN — SODIUM CHLORIDE 1000 ML: 0.9 INJECTION, SOLUTION INTRAVENOUS at 13:23

## 2023-03-29 RX ADMIN — KETOROLAC TROMETHAMINE 15 MG: 30 INJECTION, SOLUTION INTRAMUSCULAR at 13:23

## 2023-03-29 RX ADMIN — IOHEXOL 100 ML: 350 INJECTION, SOLUTION INTRAVENOUS at 14:12

## 2023-03-29 NOTE — ED PROVIDER NOTES
History  Chief Complaint   Patient presents with   • Abdominal Pain     Pt reports pain in his LLQ since Monday  Denies N/V/D      58 y/o male presents to the ED for LLQ abd pain x 3 days  States that it is a squeezing pain that initially was intermittent but now has become constant  He states that bumps in the car and walking worsen it  He denies any fever, cp, sob, n/v, d/c, or urinary symptoms  Has not tried anything for the symptoms  No hx of similar in the past  No other complaints  History provided by:  Patient  Abdominal Pain  Pain location:  LLQ  Pain quality: squeezing    Pain radiates to:  Does not radiate  Pain severity:  Moderate  Onset quality:  Sudden  Progression:  Worsening  Chronicity:  New  Context: not previous surgeries and not sick contacts    Relieved by:  None tried  Worsened by:  Nothing  Ineffective treatments:  None tried  Associated symptoms: no chest pain, no chills, no cough, no diarrhea, no dysuria, no fever, no hematuria, no nausea, no shortness of breath, no sore throat and no vomiting        Prior to Admission Medications   Prescriptions Last Dose Informant Patient Reported? Taking?    Diclofenac Sodium (VOLTAREN) 1 %   No No   Sig: Apply 2 g topically 4 (four) times a day   amLODIPine (NORVASC) 10 mg tablet   No No   Sig: Take 1 tablet (10 mg total) by mouth daily   atorvastatin (LIPITOR) 20 mg tablet   No No   Sig: Take 1 tablet (20 mg total) by mouth daily   betamethasone dipropionate 0 05 % lotion   No No   Sig: Apply topically 2 (two) times a day   carbamide peroxide (DEBROX) 6 5 % otic solution   No No   Sig: Administer 5 drops into both ears 2 (two) times a day for 5 days   lisinopril (ZESTRIL) 40 mg tablet   No No   Sig: Take 1 tablet (40 mg total) by mouth daily   loratadine (CLARITIN) 10 mg tablet   No No   Si BY MOUTH EVERY DAY IN [place]   meloxicam (MOBIC) 7 5 mg tablet   No No   Sig: Take 1 tablet (7 5 mg total) by mouth daily   metoprolol succinate (TOPROL-XL) 100 mg 24 hr tablet   No No   Sig: Take 1 tablet (100 mg total) by mouth daily      Facility-Administered Medications: None       Past Medical History:   Diagnosis Date   • Hyperlipemia    • Hypertension        History reviewed  No pertinent surgical history  Family History   Problem Relation Age of Onset   • Cancer Mother    • Aneurysm Mother    • Heart disease Father    • Coronary artery disease Father    • Diabetes Family      I have reviewed and agree with the history as documented  E-Cigarette/Vaping   • E-Cigarette Use Never User      E-Cigarette/Vaping Substances   • Nicotine No    • THC No    • CBD No    • Flavoring No    • Other No    • Unknown No      Social History     Tobacco Use   • Smoking status: Never   • Smokeless tobacco: Never   Vaping Use   • Vaping Use: Never used   Substance Use Topics   • Alcohol use: Yes     Comment: patient states he drinks qod   • Drug use: No       Review of Systems   Constitutional: Negative for chills and fever  HENT: Negative for congestion, ear pain and sore throat  Eyes: Negative for pain and visual disturbance  Respiratory: Negative for cough, shortness of breath and wheezing  Cardiovascular: Negative for chest pain and leg swelling  Gastrointestinal: Positive for abdominal pain  Negative for diarrhea, nausea and vomiting  Genitourinary: Negative for dysuria, frequency, hematuria and urgency  Musculoskeletal: Negative for neck pain and neck stiffness  Skin: Negative for rash and wound  Neurological: Negative for weakness, numbness and headaches  Psychiatric/Behavioral: Negative for agitation and confusion  All other systems reviewed and are negative  Physical Exam  Physical Exam  Vitals and nursing note reviewed  Constitutional:       Appearance: He is well-developed  HENT:      Head: Normocephalic and atraumatic  Eyes:      Pupils: Pupils are equal, round, and reactive to light     Cardiovascular:      Rate and Rhythm: Normal rate and regular rhythm  Pulmonary:      Effort: Pulmonary effort is normal       Breath sounds: Normal breath sounds  Abdominal:      General: Bowel sounds are normal       Palpations: Abdomen is soft  Tenderness: There is abdominal tenderness in the left lower quadrant  There is no guarding or rebound  Musculoskeletal:         General: Normal range of motion  Cervical back: Normal range of motion and neck supple  Skin:     General: Skin is warm and dry  Neurological:      Mental Status: He is alert and oriented to person, place, and time        Comments: No focal deficits         Vital Signs  ED Triage Vitals   Temperature Pulse Respirations Blood Pressure SpO2   03/29/23 1211 03/29/23 1211 03/29/23 1211 03/29/23 1211 03/29/23 1211   (!) 97 °F (36 1 °C) 77 18 (!) 183/105 97 %      Temp Source Heart Rate Source Patient Position - Orthostatic VS BP Location FiO2 (%)   03/29/23 1211 03/29/23 1211 03/29/23 1211 03/29/23 1211 --   Temporal Monitor Sitting Left arm       Pain Score       03/29/23 1323       9           Vitals:    03/29/23 1211 03/29/23 1245 03/29/23 1500   BP: (!) 183/105 142/84 136/69   Pulse: 77 71 65   Patient Position - Orthostatic VS: Sitting Sitting Lying         Visual Acuity      ED Medications  Medications   sodium chloride 0 9 % bolus 1,000 mL (0 mL Intravenous Stopped 3/29/23 1523)   ketorolac (TORADOL) injection 15 mg (15 mg Intravenous Given 3/29/23 1323)   iohexol (OMNIPAQUE) 350 MG/ML injection (SINGLE-DOSE) 100 mL (100 mL Intravenous Given 3/29/23 1412)       Diagnostic Studies  Results Reviewed     Procedure Component Value Units Date/Time    UA w Reflex to Microscopic w Reflex to Culture [[id number]] Collected: 03/29/23 1428    Lab Status: Final result Specimen: Urine, Clean Catch Updated: 03/29/23 1449     Color, UA Colorless     Clarity, UA Clear     Specific Gravity, UA 1 010     pH, UA 6 5     Leukocytes, UA Negative     Nitrite, UA Negative     Protein, UA Negative mg/dl      Glucose, UA Negative mg/dl      Ketones, UA Negative mg/dl      Urobilinogen, UA <2 0 mg/dl      Bilirubin, UA Negative     Occult Blood, UA Negative    CMP [[id number]] Collected: 03/29/23 1321    Lab Status: Final result Specimen: Blood from Arm, Left Updated: 03/29/23 1353     Sodium 139 mmol/L      Potassium 3 7 mmol/L      Chloride 105 mmol/L      CO2 26 mmol/L      ANION GAP 8 mmol/L      BUN 11 mg/dL      Creatinine 0 95 mg/dL      Glucose 130 mg/dL      Calcium 9 1 mg/dL      AST 25 U/L      ALT 43 U/L      Alkaline Phosphatase 69 U/L      Total Protein 6 8 g/dL      Albumin 4 2 g/dL      Total Bilirubin 0 66 mg/dL      eGFR 87 ml/min/1 73sq m     Narrative:      National Kidney Disease Foundation guidelines for Chronic Kidney Disease (CKD):   •  Stage 1 with normal or high GFR (GFR > 90 mL/min/1 73 square meters)  •  Stage 2 Mild CKD (GFR = 60-89 mL/min/1 73 square meters)  •  Stage 3A Moderate CKD (GFR = 45-59 mL/min/1 73 square meters)  •  Stage 3B Moderate CKD (GFR = 30-44 mL/min/1 73 square meters)  •  Stage 4 Severe CKD (GFR = 15-29 mL/min/1 73 square meters)  •  Stage 5 End Stage CKD (GFR <15 mL/min/1 73 square meters)  Note: GFR calculation is accurate only with a steady state creatinine    Lipase [[id number]]  (Normal) Collected: 03/29/23 1321    Lab Status: Final result Specimen: Blood from Arm, Left Updated: 03/29/23 1353     Lipase 12 u/L     CBC and differential [[id number]] Collected: 03/29/23 1321    Lab Status: Final result Specimen: Blood from Arm, Left Updated: 03/29/23 1331     WBC 6 77 Thousand/uL      RBC 4 81 Million/uL      Hemoglobin 15 4 g/dL      Hematocrit 46 1 %      MCV 96 fL      MCH 32 0 pg      MCHC 33 4 g/dL      RDW 12 7 %      MPV 10 9 fL      Platelets 498 Thousands/uL      nRBC 0 /100 WBCs      Neutrophils Relative 59 %      Immat GRANS % 0 %      Lymphocytes Relative 31 %      Monocytes Relative 6 %      Eosinophils Relative 3 %      Basophils Relative 1 %      Neutrophils Absolute 4 06 Thousands/µL      Immature Grans Absolute 0 01 Thousand/uL      Lymphocytes Absolute 2 07 Thousands/µL      Monocytes Absolute 0 40 Thousand/µL      Eosinophils Absolute 0 19 Thousand/µL      Basophils Absolute 0 04 Thousands/µL                  CT abdomen pelvis with contrast   Final Result by Aby Jacinto MD (03/29 1505)      Epiploic appendagitis involving descending colon  This is a benign self-limiting process which can account for the patient's acute left-sided abdominal pain  Incidental discovery of vesicourachal diverticulum  This is a congenital anomaly that can predispose to infectious process of the urinary bladder  Hepatic steatosis  Incidentally discovered right adrenal nodule, 17 mm, unfortunately incompletely characterized on contrast only examination  Because this is greater than 15 mm in size, recommendation is for nonemergent follow-up outpatient noncontrast and    contrast-enhanced adrenal protocol CT to confirm the suspicion that this represents a benign adrenal adenoma  [facility]      Workstation performed: [id number]                    Procedures  Procedures         ED Course  ED Course as of 03/29/23 1603   Wed Mar 29, 2023   1308 Llq abd pain since Monday initially intermittent but worsening  Has been constant today  Squeezing pain  Worse with bumps in the car and walking  No hx of similar  Has not tried anything for it                                 SBIRT 20yo+    Flowsheet Row Most Recent Value   SBIRT (25 yo +)    In order to provide better care to our patients, we are screening all of our patients for alcohol and drug use  Would it be okay to ask you these screening questions? Yes Filed at: 03/29/2023 1534   Initial Alcohol Screen: US AUDIT-C     1  How often do you have a drink containing alcohol? 0 Filed at: 03/29/2023 1534   2  How many drinks containing alcohol do you have on a typical day you are drinking?   0 Filed at: 03/29/2023 1534   3a  Male UNDER 65: How often do you have five or more drinks on one occasion? 0 Filed at: 03/29/2023 1534   Audit-C Score 0 Filed at: 03/29/2023 1534   NILSA: How many times in the past year have you    Used an illegal drug or used a prescription medication for non-medical reasons? Never Filed at: 03/29/2023 1534                    Medical Decision Making  58 y/o male with LLQ abd pain- will get labs, UA, and ct abd/pel  Will give toradol and reasses  Labs and ct reviewed- patient with epiploic appendagitis- instructed NSAIDs and follow up with PCP  Patient informed about incidental findings and nreed for follow up     Epiploic appendagitis: acute illness or injury  Amount and/or Complexity of Data Reviewed  Labs: ordered  Radiology: ordered  Risk  Prescription drug management  Disposition  Final diagnoses:   Epiploic appendagitis     Time reflects when diagnosis was documented in both MDM as applicable and the Disposition within this note     Time User Action Codes Description Comment    3/29/2023  3:22 PM Le Lawrence [[id number]] Epiploic appendagitis       ED Disposition     ED Disposition   Discharge    Condition   Stable    Date/Time   Wed Mar 29, 2023  3:22 PM    [address] 70 discharge to home/self care                 Follow-up Information     Follow up With Specialties Details Why Contact Info Additional Information    Glenn Russell MD Internal Medicine Call in 1 day for follow up within 2-3 days [id number]  [place] [address]  [facility] [phone]       [facility] Emergency Department Emergency Medicine Go to  immediately for any new or worsening symptoms Candelario Nguyen [address] [facility] [address] [facility] Emergency Department, [address] [facility], [address] [place] [address], [id number]          Discharge Medication List as of 3/29/2023  3:25 PM      CONTINUE these medications which have NOT CHANGED    Details   amLODIPine (NORVASC) 10 mg tablet Take 1 tablet (10 mg total) by mouth daily, Starting Mon 11/28/2022, Normal      atorvastatin (LIPITOR) 20 mg tablet Take 1 tablet (20 mg total) by mouth daily, Starting Mon 11/28/2022, Normal      betamethasone dipropionate 0 05 % lotion Apply topically 2 (two) times a day, Starting Thu 4/28/2022, Normal      carbamide peroxide (DEBROX) 6 5 % otic solution Administer 5 drops into both ears 2 (two) times a day for 5 days, Starting Mon 1/23/2023, Until Sat 1/28/2023, Normal      Diclofenac Sodium (VOLTAREN) 1 % Apply 2 g topically 4 (four) times a day, Starting Mon 1/23/2023, Normal      lisinopril (ZESTRIL) 40 mg tablet Take 1 tablet (40 mg total) by mouth daily, Starting Mon 11/28/2022, Normal      loratadine (CLARITIN) 10 mg tablet 1 BY MOUTH EVERY DAY IN [place], Normal      meloxicam (MOBIC) 7 5 mg tablet Take 1 tablet (7 5 mg total) by mouth daily, Starting Mon 9/13/2021, Normal      metoprolol succinate (TOPROL-XL) 100 mg 24 hr tablet Take 1 tablet (100 mg total) by mouth daily, Starting Mon 11/28/2022, Normal             No discharge procedures on file      PDMP Review     None          ED Provider  Electronically Signed by           Mami Anglin DO  03/29/23 7316

## 2023-03-29 NOTE — DISCHARGE INSTRUCTIONS
Your CT shows Epiploic appendagitis involving descending colon  This is a benign self-limiting process which can account for the patient's acute left-sided abdominal pain  Incidental discovery of vesicourachal diverticulum  This is a congenital anomaly that can predispose to infectious process of the urinary bladder  Hepatic steatosis  Incidentally discovered right adrenal nodule, 17 mm, unfortunately incompletely characterized on contrast only examination  Because this is greater than 15 mm in size, recommendation is for nonemergent follow-up outpatient noncontrast and   contrast-enhanced adrenal protocol CT to confirm the suspicion that this represents a benign adrenal adenoma  Please follow up with your PCP in regards to these findings

## 2023-04-19 ENCOUNTER — APPOINTMENT (OUTPATIENT)
Dept: LAB | Facility: HOSPITAL | Age: 58
End: 2023-04-19
Attending: INTERNAL MEDICINE

## 2023-04-19 DIAGNOSIS — I10 HYPERTENSION, UNSPECIFIED TYPE: ICD-10-CM

## 2023-04-19 DIAGNOSIS — E78.5 HYPERLIPIDEMIA, UNSPECIFIED HYPERLIPIDEMIA TYPE: ICD-10-CM

## 2023-04-19 DIAGNOSIS — I10 PRIMARY HYPERTENSION: ICD-10-CM

## 2023-04-19 DIAGNOSIS — Z12.5 PROSTATE CANCER SCREENING: ICD-10-CM

## 2023-04-19 LAB
ALBUMIN SERPL BCP-MCNC: 4 G/DL (ref 3.5–5)
ALP SERPL-CCNC: 55 U/L (ref 34–104)
ALT SERPL W P-5'-P-CCNC: 81 U/L (ref 7–52)
ANION GAP SERPL CALCULATED.3IONS-SCNC: 8 MMOL/L (ref 4–13)
AST SERPL W P-5'-P-CCNC: 32 U/L (ref 13–39)
BASOPHILS # BLD AUTO: 0.06 THOUSANDS/ΜL (ref 0–0.1)
BASOPHILS NFR BLD AUTO: 1 % (ref 0–1)
BILIRUB SERPL-MCNC: 0.42 MG/DL (ref 0.2–1)
BUN SERPL-MCNC: 17 MG/DL (ref 5–25)
CALCIUM SERPL-MCNC: 9 MG/DL (ref 8.4–10.2)
CHLORIDE SERPL-SCNC: 107 MMOL/L (ref 96–108)
CHOLEST SERPL-MCNC: 157 MG/DL
CO2 SERPL-SCNC: 24 MMOL/L (ref 21–32)
CREAT SERPL-MCNC: 0.84 MG/DL (ref 0.6–1.3)
EOSINOPHIL # BLD AUTO: 0.14 THOUSAND/ΜL (ref 0–0.61)
EOSINOPHIL NFR BLD AUTO: 1 % (ref 0–6)
ERYTHROCYTE [DISTWIDTH] IN BLOOD BY AUTOMATED COUNT: 12.4 % (ref 11.6–15.1)
GFR SERPL CREATININE-BSD FRML MDRD: 95 ML/MIN/1.73SQ M
GLUCOSE P FAST SERPL-MCNC: 91 MG/DL (ref 65–99)
HCT VFR BLD AUTO: 45 % (ref 36.5–49.3)
HDLC SERPL-MCNC: 45 MG/DL
HGB BLD-MCNC: 15.2 G/DL (ref 12–17)
IMM GRANULOCYTES # BLD AUTO: 0.06 THOUSAND/UL (ref 0–0.2)
IMM GRANULOCYTES NFR BLD AUTO: 1 % (ref 0–2)
LDLC SERPL CALC-MCNC: 89 MG/DL (ref 0–100)
LYMPHOCYTES # BLD AUTO: 3.58 THOUSANDS/ΜL (ref 0.6–4.47)
LYMPHOCYTES NFR BLD AUTO: 37 % (ref 14–44)
MCH RBC QN AUTO: 31.5 PG (ref 26.8–34.3)
MCHC RBC AUTO-ENTMCNC: 33.8 G/DL (ref 31.4–37.4)
MCV RBC AUTO: 93 FL (ref 82–98)
MONOCYTES # BLD AUTO: 0.81 THOUSAND/ΜL (ref 0.17–1.22)
MONOCYTES NFR BLD AUTO: 8 % (ref 4–12)
NEUTROPHILS # BLD AUTO: 5.13 THOUSANDS/ΜL (ref 1.85–7.62)
NEUTS SEG NFR BLD AUTO: 52 % (ref 43–75)
NRBC BLD AUTO-RTO: 0 /100 WBCS
PLATELET # BLD AUTO: 262 THOUSANDS/UL (ref 149–390)
PMV BLD AUTO: 10.4 FL (ref 8.9–12.7)
POTASSIUM SERPL-SCNC: 3.5 MMOL/L (ref 3.5–5.3)
PROT SERPL-MCNC: 6.5 G/DL (ref 6.4–8.4)
RBC # BLD AUTO: 4.82 MILLION/UL (ref 3.88–5.62)
SODIUM SERPL-SCNC: 139 MMOL/L (ref 135–147)
TRIGL SERPL-MCNC: 113 MG/DL
TSH SERPL DL<=0.05 MIU/L-ACNC: 2.4 UIU/ML (ref 0.45–4.5)
WBC # BLD AUTO: 9.78 THOUSAND/UL (ref 4.31–10.16)

## 2023-04-20 DIAGNOSIS — E27.8 ADRENAL NODULE (HCC): Primary | ICD-10-CM

## 2023-04-21 LAB
EST. AVERAGE GLUCOSE BLD GHB EST-MCNC: 123 MG/DL
HBA1C MFR BLD: 5.9 %
PSA FREE MFR SERPL: 11.4 %
PSA FREE SERPL-MCNC: 0.33 NG/ML
PSA SERPL-MCNC: 2.9 NG/ML (ref 0–4)

## 2023-06-01 ENCOUNTER — OFFICE VISIT (OUTPATIENT)
Dept: INTERNAL MEDICINE CLINIC | Facility: CLINIC | Age: 58
End: 2023-06-01

## 2023-06-01 VITALS
OXYGEN SATURATION: 98 % | DIASTOLIC BLOOD PRESSURE: 80 MMHG | SYSTOLIC BLOOD PRESSURE: 130 MMHG | HEIGHT: 72 IN | BODY MASS INDEX: 36.3 KG/M2 | RESPIRATION RATE: 14 BRPM | WEIGHT: 268 LBS | HEART RATE: 85 BPM

## 2023-06-01 DIAGNOSIS — E27.8 ADRENAL NODULE (HCC): ICD-10-CM

## 2023-06-01 DIAGNOSIS — J01.00 SUBACUTE MAXILLARY SINUSITIS: Primary | ICD-10-CM

## 2023-06-01 RX ORDER — AZITHROMYCIN 250 MG/1
TABLET, FILM COATED ORAL
Qty: 6 TABLET | Refills: 0 | Status: SHIPPED | OUTPATIENT
Start: 2023-06-01 | End: 2023-06-06

## 2023-06-01 RX ORDER — METHYLPREDNISOLONE 4 MG/1
TABLET ORAL
Qty: 21 EACH | Refills: 0 | Status: SHIPPED | OUTPATIENT
Start: 2023-06-01

## 2023-06-01 NOTE — PROGRESS NOTES
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  Reports tenderness to his maxillary sinuses, may have chills, denies fever  Also reports sore throat  Plan as below  Also need to call the patient back about CT adrenal study; Will check to see what the correct order is;  Quality Measures:     BMI Counseling: Body mass index is 36 35 kg/m²  The BMI is above normal  Nutrition recommendations include decreasing portion sizes and encouraging healthy choices of fruits and vegetables  Exercise recommendations include moderate physical activity 150 minutes/week  Rationale for BMI follow-up plan is due to patient being overweight or obese  Depression Screening and Follow-up Plan: Clincally patient does not have depression  No treatment is required  Return for Next scheduled follow up  No problem-specific Assessment & Plan notes found for this encounter  Diagnoses and all orders for this visit:    Subacute maxillary sinusitis  -     azithromycin (Zithromax) 250 mg tablet; Take 2 tablets (500 mg total) by mouth daily for 1 day, THEN 1 tablet (250 mg total) daily for 4 days  -     methylPREDNISolone 4 MG tablet therapy pack; Use as directed on package          Subjective:      Patient ID: Elana Marte is a 59 y o  male  Here with sick complaints      ALLERGIES:  No Known Allergies    CURRENT MEDICATIONS:    Current Outpatient Medications:   •  amLODIPine (NORVASC) 10 mg tablet, Take 1 tablet (10 mg total) by mouth daily, Disp: 90 tablet, Rfl: 3  •  atorvastatin (LIPITOR) 20 mg tablet, Take 1 tablet (20 mg total) by mouth daily, Disp: 90 tablet, Rfl: 3  •  azithromycin (Zithromax) 250 mg tablet, Take 2 tablets (500 mg total) by mouth daily for 1 day, THEN 1 tablet (250 mg total) daily for 4 days  , Disp: 6 tablet, Rfl: 0  •  Diclofenac Sodium (VOLTAREN) 1 %, Apply 2 g topically 4 (four) times a day, Disp: 350 g, Rfl: 0  •  lisinopril (ZESTRIL) 40 mg tablet, Take 1 tablet (40 mg total) by mouth daily, Disp: 90 tablet, Rfl: 3  •  loratadine (CLARITIN) 10 mg tablet, 1 BY MOUTH EVERY DAY IN [place], Disp: 90 tablet, Rfl: 3  •  meloxicam (MOBIC) 7 5 mg tablet, Take 1 tablet (7 5 mg total) by mouth daily, Disp: 30 tablet, Rfl: 5  •  methylPREDNISolone 4 MG tablet therapy pack, Use as directed on package, Disp: 21 each, Rfl: 0  •  metoprolol succinate (TOPROL-XL) 100 mg 24 hr tablet, Take 1 tablet (100 mg total) by mouth daily, Disp: 90 tablet, Rfl: 3  •  betamethasone dipropionate 0 05 % lotion, Apply topically 2 (two) times a day (Patient not taking: Reported on 4/14/2023), Disp: 60 mL, Rfl: 0  •  carbamide peroxide (DEBROX) 6 5 % otic solution, Administer 5 drops into the left ear 2 (two) times a day for 5 days (Patient not taking: Reported on 4/14/2023), Disp: 15 mL, Rfl: 0    ACTIVE PROBLEM LIST:  Patient Active Problem List   Diagnosis   • Arthralgia   • Erectile dysfunction of non-organic origin   • Esophageal reflux   • Generalized anxiety disorder   • Gout   • Hyperlipidemia   • Hypertension   • Lumbar radiculopathy   • Abnormal radiographic examination   • Obesity   • Lumbar pain   • Polycythemia       PAST MEDICAL HISTORY:  Past Medical History:   Diagnosis Date   • Hyperlipemia    • Hypertension        PAST SURGICAL HISTORY:  History reviewed  No pertinent surgical history      FAMILY HISTORY:  Family History   Problem Relation Age of Onset   • Cancer Mother    • Aneurysm Mother    • Heart disease Father    • Coronary artery disease Father    • Diabetes Family        SOCIAL HISTORY:  Social History     Socioeconomic History   • Marital status: Single     Spouse name: Not on file   • Number of children: Not on file   • Years of education: Not on file   • Highest education level: Not on file   Occupational History   • Not on file   Tobacco Use   • Smoking status: Never   • Smokeless tobacco: Never   Vaping Use   • Vaping Use: Never used   Substance and Sexual Activity   • Alcohol use: Yes     Comment: patient states he drinks qod   • Drug use: No   • Sexual activity: Not Currently   Other Topics Concern   • Not on file   Social History Narrative   • Not on file     Social Determinants of Health     Financial Resource Strain: Not on file   Food Insecurity: Not on file   Transportation Needs: Not on file   Physical Activity: Not on file   Stress: Not on file   Social Connections: Not on file   Intimate Partner Violence: Not on file   Housing Stability: Not on file       Review of Systems   Constitutional: Negative for chills and fever  HENT: Positive for congestion, sinus pressure, sinus pain and sore throat  Negative for ear pain  Eyes: Negative for pain and visual disturbance  Respiratory: Negative for cough and shortness of breath  Cardiovascular: Negative for chest pain and palpitations  Gastrointestinal: Negative for abdominal pain and vomiting  Genitourinary: Negative for dysuria and hematuria  Musculoskeletal: Negative for arthralgias and back pain  Skin: Negative for color change and rash  Neurological: Negative for seizures and syncope  All other systems reviewed and are negative  Objective:  Vitals:    06/01/23 1546   BP: 130/80   BP Location: Left arm   Patient Position: Sitting   Pulse: 85   Resp: 14   SpO2: 98%   Weight: 122 kg (268 lb)   Height: 6' (1 829 m)     Body mass index is 36 35 kg/m²  Physical Exam  Vitals and nursing note reviewed  Constitutional:       Appearance: Normal appearance  He is obese  HENT:      Head: Normocephalic and atraumatic  Nose: Congestion present  Mouth/Throat:      Mouth: Mucous membranes are moist       Pharynx: Posterior oropharyngeal erythema present  Cardiovascular:      Rate and Rhythm: Normal rate     Pulmonary:      Effort: Pulmonary effort is normal  Musculoskeletal:         General: Normal range of motion  Cervical back: Normal range of motion  Skin:     General: Skin is warm and dry  Findings: Erythema present  Neurological:      General: No focal deficit present  Mental Status: He is alert and oriented to person, place, and time  Mental status is at baseline  Psychiatric:         Mood and Affect: Mood normal            RESULTS:    In chart    This note was created with voice recognition software  Phonic, grammatical and spelling errors may be present within the note as a result

## 2023-06-02 DIAGNOSIS — E27.8 ADRENAL NODULE (HCC): Primary | ICD-10-CM

## 2023-06-16 ENCOUNTER — HOSPITAL ENCOUNTER (OUTPATIENT)
Dept: RADIOLOGY | Facility: MEDICAL CENTER | Age: 58
Discharge: HOME | End: 2023-06-16
Payer: COMMERCIAL

## 2023-06-16 DIAGNOSIS — E27.8 ADRENAL NODULE (HCC): ICD-10-CM

## 2023-06-16 PROCEDURE — 74170 CT ABD WO CNTRST FLWD CNTRST: CPT

## 2023-06-16 PROCEDURE — G1004 CDSM NDSC: HCPCS

## 2023-06-16 RX ADMIN — IOHEXOL 100 ML: 350 INJECTION, SOLUTION INTRAVENOUS at 09:27

## 2023-07-05 RX ORDER — SODIUM CHLORIDE, SODIUM LACTATE, POTASSIUM CHLORIDE, CALCIUM CHLORIDE 600; 310; 30; 20 MG/100ML; MG/100ML; MG/100ML; MG/100ML
125 INJECTION, SOLUTION INTRAVENOUS CONTINUOUS
Status: CANCELLED | OUTPATIENT
Start: 2023-07-05

## 2023-07-06 ENCOUNTER — ANESTHESIA EVENT (OUTPATIENT)
Dept: GASTROENTEROLOGY | Facility: HOSPITAL | Age: 58
End: 2023-07-06

## 2023-07-06 ENCOUNTER — ANESTHESIA (OUTPATIENT)
Dept: GASTROENTEROLOGY | Facility: HOSPITAL | Age: 58
End: 2023-07-06

## 2023-07-06 ENCOUNTER — HOSPITAL ENCOUNTER (OUTPATIENT)
Dept: GASTROENTEROLOGY | Facility: HOSPITAL | Age: 58
Setting detail: OUTPATIENT SURGERY
End: 2023-07-06
Attending: INTERNAL MEDICINE
Payer: COMMERCIAL

## 2023-07-06 VITALS
HEART RATE: 65 BPM | TEMPERATURE: 97.7 F | OXYGEN SATURATION: 96 % | BODY MASS INDEX: 34.97 KG/M2 | DIASTOLIC BLOOD PRESSURE: 90 MMHG | SYSTOLIC BLOOD PRESSURE: 126 MMHG | WEIGHT: 258.16 LBS | HEIGHT: 72 IN | RESPIRATION RATE: 15 BRPM

## 2023-07-06 DIAGNOSIS — K76.0 FATTY LIVER: ICD-10-CM

## 2023-07-06 DIAGNOSIS — R10.32 LEFT LOWER QUADRANT ABDOMINAL PAIN: ICD-10-CM

## 2023-07-06 DIAGNOSIS — Z12.11 ENCOUNTER FOR SCREENING COLONOSCOPY: ICD-10-CM

## 2023-07-06 PROBLEM — F17.200 SMOKING: Status: ACTIVE | Noted: 2023-07-06

## 2023-07-06 PROBLEM — IMO0001 SMOKING: Status: ACTIVE | Noted: 2023-07-06

## 2023-07-06 PROCEDURE — 88305 TISSUE EXAM BY PATHOLOGIST: CPT | Performed by: STUDENT IN AN ORGANIZED HEALTH CARE EDUCATION/TRAINING PROGRAM

## 2023-07-06 PROCEDURE — 45385 COLONOSCOPY W/LESION REMOVAL: CPT | Performed by: INTERNAL MEDICINE

## 2023-07-06 RX ORDER — PROPOFOL 10 MG/ML
INJECTION, EMULSION INTRAVENOUS AS NEEDED
Status: DISCONTINUED | OUTPATIENT
Start: 2023-07-06 | End: 2023-07-06

## 2023-07-06 RX ORDER — LIDOCAINE HYDROCHLORIDE 10 MG/ML
INJECTION, SOLUTION EPIDURAL; INFILTRATION; INTRACAUDAL; PERINEURAL AS NEEDED
Status: DISCONTINUED | OUTPATIENT
Start: 2023-07-06 | End: 2023-07-06

## 2023-07-06 RX ORDER — SODIUM CHLORIDE, SODIUM LACTATE, POTASSIUM CHLORIDE, CALCIUM CHLORIDE 600; 310; 30; 20 MG/100ML; MG/100ML; MG/100ML; MG/100ML
INJECTION, SOLUTION INTRAVENOUS CONTINUOUS PRN
Status: DISCONTINUED | OUTPATIENT
Start: 2023-07-06 | End: 2023-07-06

## 2023-07-06 RX ADMIN — PROPOFOL 40 MG: 10 INJECTION, EMULSION INTRAVENOUS at 11:30

## 2023-07-06 RX ADMIN — SODIUM CHLORIDE, SODIUM LACTATE, POTASSIUM CHLORIDE, AND CALCIUM CHLORIDE: .6; .31; .03; .02 INJECTION, SOLUTION INTRAVENOUS at 11:03

## 2023-07-06 RX ADMIN — PROPOFOL 75 MG: 10 INJECTION, EMULSION INTRAVENOUS at 11:27

## 2023-07-06 RX ADMIN — PROPOFOL 40 MG: 10 INJECTION, EMULSION INTRAVENOUS at 11:35

## 2023-07-06 RX ADMIN — PROPOFOL 50 MG: 10 INJECTION, EMULSION INTRAVENOUS at 11:33

## 2023-07-06 RX ADMIN — PROPOFOL 40 MG: 10 INJECTION, EMULSION INTRAVENOUS at 11:38

## 2023-07-06 RX ADMIN — LIDOCAINE HYDROCHLORIDE 50 MG: 10 INJECTION, SOLUTION EPIDURAL; INFILTRATION; INTRACAUDAL at 11:25

## 2023-07-06 RX ADMIN — PROPOFOL 75 MG: 10 INJECTION, EMULSION INTRAVENOUS at 11:26

## 2023-07-06 NOTE — H&P
History and Physical -  [facility]  Bernadette Vu 59 y.o. male MRN: [id number]      HPI: Bernadette Vu is a 59y.o. year old male who presents for evaluation of left lower quadrant abdominal pain, screening colonoscopy      REVIEW OF SYSTEMS: Per the HPI, and otherwise unremarkable. Historical Information   Past Medical History:   Diagnosis Date   • Hyperlipemia    • Hypertension      Past Surgical History:   Procedure Laterality Date   • EGD       Social History   Social History     Substance and Sexual Activity   Alcohol Use Yes    Comment: 2-3 a day     Social History     Substance and Sexual Activity   Drug Use Yes   • Types: Marijuana     Social History     Tobacco Use   Smoking Status Never   Smokeless Tobacco Never     Family History   Problem Relation Age of Onset   • Cancer Mother    • Aneurysm Mother    • Heart disease Father    • Coronary artery disease Father    • Diabetes Family        Meds/Allergies     (Not in a hospital admission)      No Known Allergies    Objective     Blood pressure 139/85, pulse 86, temperature 97.7 °F (36.5 °C), temperature source Temporal, resp. rate 16, height 6' (1.829 m), weight 117 kg (258 lb 2.5 oz), SpO2 96 %. PHYSICAL EXAM    Gen: NAD  CV: RRR  CHEST: Clear  ABD: soft, NT/ND  EXT: no edema      ASSESSMENT/PLAN:  This is a 59y.o. year old male here for colonoscopy, and he is stable and optimized for his procedure.

## 2023-07-06 NOTE — ANESTHESIA POSTPROCEDURE EVALUATION
Post-Op Assessment Note    CV Status:  Stable  Pain Score: 0    Pain management: adequate     Mental Status:  Arousable and sleepy   Hydration Status:  Euvolemic   PONV Controlled:  Controlled   Airway Patency:  Patent      Post Op Vitals Reviewed: Yes      Staff: CRNA         No notable events documented.     BP      Temp      Pulse     Resp      SpO2

## 2023-07-06 NOTE — ANESTHESIA PREPROCEDURE EVALUATION
Procedure:  COLONOSCOPY    Relevant Problems   ANESTHESIA (within normal limits)   (-) History of anesthesia complications      CARDIO   (+) Hyperlipidemia   (+) Hypertension      ENDO (within normal limits)      GI/HEPATIC  Confirmed NPO appropriate  s/p bowel prep  Admits to drinking 2-3 drinks per day   (+) Esophageal reflux      /RENAL (within normal limits)      HEMATOLOGY (within normal limits)      MUSCULOSKELETAL   (+) Gout   (+) Lumbar pain      NEURO/PSYCH   (+) Generalized anxiety disorder      PULMONARY  Possible [place]   (+) Smoking (marijuana, last use yesterday)   (-) URI (upper respiratory infection)      Other   (+) Polycythemia        Physical Exam    Airway  Comment: Full beard  Mallampati score: II  TM Distance: >3 FB  Neck ROM: full     Dental       Cardiovascular  Rhythm: regular, Rate: normal,     Pulmonary  Breath sounds clear to auscultation,     Other Findings        Anesthesia Plan  ASA Score- 3     Anesthesia Type- IV sedation with anesthesia with ASA Monitors. Additional Monitors:   Airway Plan:     Comment: I discussed the risks and benefits of IV sedation anesthesia including the possibility of the need to convert to general anesthesia and the potential risk of awareness. The patient was given the opportunity to ask questions, which were answered. .       Plan Factors-Exercise tolerance (METS): >4 METS. Chart reviewed. Patient is a current smoker. Patient did not smoke on day of surgery. Obstructive sleep apnea risk education given perioperatively. Induction- intravenous. Postoperative Plan-     Informed Consent- Anesthetic plan and risks discussed with patient. I personally reviewed this patient with the CRNA. Discussed and agreed on the Anesthesia Plan with the CRNA. Hira Prather

## 2023-07-10 PROCEDURE — 88305 TISSUE EXAM BY PATHOLOGIST: CPT | Performed by: STUDENT IN AN ORGANIZED HEALTH CARE EDUCATION/TRAINING PROGRAM

## 2023-07-11 ENCOUNTER — TELEPHONE (OUTPATIENT)
Dept: GASTROENTEROLOGY | Facility: CLINIC | Age: 58
End: 2023-07-11

## 2023-07-11 NOTE — TELEPHONE ENCOUNTER
----- Message from Blank Webster DO sent at 7/11/2023  6:47 AM EDT -----  Please call the patient with the polyp results. 3 polyps taken from the colon were all precancerous adenomas and completely removed.   The patient due for repeat colonoscopy in 3 years

## 2023-12-19 ENCOUNTER — TELEPHONE (OUTPATIENT)
Dept: INTERNAL MEDICINE CLINIC | Facility: CLINIC | Age: 58
End: 2023-12-19

## 2023-12-19 DIAGNOSIS — I10 HYPERTENSION, UNSPECIFIED TYPE: ICD-10-CM

## 2023-12-19 DIAGNOSIS — J01.10 ACUTE NON-RECURRENT FRONTAL SINUSITIS: ICD-10-CM

## 2023-12-19 RX ORDER — LORATADINE 10 MG/1
TABLET ORAL
Qty: 90 TABLET | Refills: 1 | Status: SHIPPED | OUTPATIENT
Start: 2023-12-19

## 2023-12-19 RX ORDER — METOPROLOL SUCCINATE 100 MG/1
100 TABLET, EXTENDED RELEASE ORAL DAILY
Qty: 90 TABLET | Refills: 1 | Status: SHIPPED | OUTPATIENT
Start: 2023-12-19

## 2023-12-19 NOTE — TELEPHONE ENCOUNTER
Patient is requesting refill on loraaidine 10 mg tablet, 1 by mouth every day in spring (he is having allergy issues now as well); metoprolol succinate 100 mg 24 hr tablet, take 1 tablet (100 mg total) by mouth daily. 90 day supply    [facility]/[place]    Call back #[phone]

## 2024-02-02 DIAGNOSIS — I10 ESSENTIAL HYPERTENSION: ICD-10-CM

## 2024-02-02 DIAGNOSIS — I10 HYPERTENSION, UNSPECIFIED TYPE: ICD-10-CM

## 2024-02-02 DIAGNOSIS — E78.5 HYPERLIPIDEMIA, UNSPECIFIED HYPERLIPIDEMIA TYPE: ICD-10-CM

## 2024-02-02 RX ORDER — AMLODIPINE BESYLATE 10 MG/1
10 TABLET ORAL DAILY
Qty: 90 TABLET | Refills: 1 | Status: SHIPPED | OUTPATIENT
Start: 2024-02-02

## 2024-02-02 RX ORDER — ATORVASTATIN CALCIUM 20 MG/1
20 TABLET, FILM COATED ORAL DAILY
Qty: 90 TABLET | Refills: 1 | Status: SHIPPED | OUTPATIENT
Start: 2024-02-02

## 2024-02-02 RX ORDER — LISINOPRIL 40 MG/1
40 TABLET ORAL DAILY
Qty: 90 TABLET | Refills: 1 | Status: SHIPPED | OUTPATIENT
Start: 2024-02-02

## 2024-02-02 NOTE — TELEPHONE ENCOUNTER
Pt will be out of licinipril dennise if its possible to send these 3?       Need atovistatin  90 day supply      Licinipril  40 mg   90 day supply      Amlodipine  10 mg    90 days        [facility]

## 2024-03-26 ENCOUNTER — TELEPHONE (OUTPATIENT)
Dept: INTERNAL MEDICINE CLINIC | Facility: CLINIC | Age: 59
End: 2024-03-26

## 2024-03-26 DIAGNOSIS — B49 FUNGAL INFECTION: Primary | ICD-10-CM

## 2024-03-26 RX ORDER — PRENATAL VIT 91/IRON/FOLIC/DHA 28-975-200
COMBINATION PACKAGE (EA) ORAL 2 TIMES DAILY
Qty: 12 G | Refills: 0 | Status: SHIPPED | OUTPATIENT
Start: 2024-03-26

## 2024-03-26 NOTE — TELEPHONE ENCOUNTER
Patient states it was a type of cream that was called in last year. He does not remember the name of it.

## 2024-03-26 NOTE — TELEPHONE ENCOUNTER
Patient is asking if you could renew his script for the lotion for ring worm?    [facility] [place]

## 2024-04-08 ENCOUNTER — OFFICE VISIT (OUTPATIENT)
Dept: INTERNAL MEDICINE CLINIC | Facility: CLINIC | Age: 59
End: 2024-04-08
Payer: COMMERCIAL

## 2024-04-08 VITALS
SYSTOLIC BLOOD PRESSURE: 144 MMHG | HEART RATE: 78 BPM | DIASTOLIC BLOOD PRESSURE: 90 MMHG | TEMPERATURE: 98.8 F | OXYGEN SATURATION: 99 %

## 2024-04-08 DIAGNOSIS — I10 ESSENTIAL HYPERTENSION: Primary | ICD-10-CM

## 2024-04-08 DIAGNOSIS — E78.5 HYPERLIPIDEMIA, UNSPECIFIED HYPERLIPIDEMIA TYPE: ICD-10-CM

## 2024-04-08 DIAGNOSIS — R73.03 PREDIABETES: ICD-10-CM

## 2024-04-08 DIAGNOSIS — Z12.5 PROSTATE CANCER SCREENING: ICD-10-CM

## 2024-04-08 DIAGNOSIS — J01.00 SUBACUTE MAXILLARY SINUSITIS: ICD-10-CM

## 2024-04-08 PROCEDURE — 99213 OFFICE O/P EST LOW 20 MIN: CPT | Performed by: INTERNAL MEDICINE

## 2024-04-08 RX ORDER — METHYLPREDNISOLONE 4 MG/1
TABLET ORAL
Qty: 21 EACH | Refills: 0 | Status: SHIPPED | OUTPATIENT
Start: 2024-04-08

## 2024-04-08 RX ORDER — AMOXICILLIN AND CLAVULANATE POTASSIUM 875; 125 MG/1; MG/1
1 TABLET, FILM COATED ORAL EVERY 12 HOURS SCHEDULED
Qty: 20 TABLET | Refills: 0 | Status: SHIPPED | OUTPATIENT
Start: 2024-04-08 | End: 2024-04-18

## 2024-04-08 NOTE — PROGRESS NOTES
Assessment/Plan:      Abt sent for sinus infection. See hpi for more details.    Labs ordered for next visit.    Quality Measures:       Depression Screening and Follow-up Plan: Patient was screened for depression during today's encounter. They screened negative with a PHQ-2 score of 0.         Return in about 3 months (around 7/8/2024).    No problem-specific Assessment & Plan notes found for this encounter.       Diagnoses and all orders for this visit:    Essential hypertension  -     CBC and differential; Future  -     Comprehensive metabolic panel; Future  -     TSH, 3rd generation with Free T4 reflex; Future    Hyperlipidemia, unspecified hyperlipidemia type  -     Lipid Panel with Direct LDL reflex; Future    Prostate cancer screening  -     PSA, total and free; Future    Prediabetes  -     Hemoglobin A1C; Future    Subacute maxillary sinusitis  -     amoxicillin-clavulanate (AUGMENTIN) 875-125 mg per tablet; Take 1 tablet by mouth every 12 (twelve) hours for 10 days  -     methylPREDNISolone 4 MG tablet therapy pack; Use as directed on package          Subjective:      Patient ID: Ferdinand Menon is a 58 y.o. male.    Here with headache, sinus headache x 1.5 weeks; reports sinus pressure, facial pressure. Denies fever; failed otc treatment.        ALLERGIES:  No Known Allergies    CURRENT MEDICATIONS:    Current Outpatient Medications:     amLODIPine (NORVASC) 10 mg tablet, Take 1 tablet (10 mg total) by mouth daily, Disp: 90 tablet, Rfl: 1    amoxicillin-clavulanate (AUGMENTIN) 875-125 mg per tablet, Take 1 tablet by mouth every 12 (twelve) hours for 10 days, Disp: 20 tablet, Rfl: 0    atorvastatin (LIPITOR) 20 mg tablet, Take 1 tablet (20 mg total) by mouth daily, Disp: 90 tablet, Rfl: 1    Diclofenac Sodium (VOLTAREN) 1 %, Apply 2 g topically 4 (four) times a day, Disp: 350 g, Rfl: 0    lisinopril (ZESTRIL) 40 mg tablet, Take 1 tablet (40 mg total) by mouth daily, Disp: 90 tablet, Rfl: 1    loratadine  (CLARITIN) 10 mg tablet, 1 BY MOUTH EVERY DAY IN SPRING, Disp: 90 tablet, Rfl: 1    meloxicam (MOBIC) 7.5 mg tablet, Take 1 tablet (7.5 mg total) by mouth daily, Disp: 30 tablet, Rfl: 5    methylPREDNISolone 4 MG tablet therapy pack, Use as directed on package, Disp: 21 each, Rfl: 0    metoprolol succinate (TOPROL-XL) 100 mg 24 hr tablet, Take 1 tablet (100 mg total) by mouth daily, Disp: 90 tablet, Rfl: 1    betamethasone dipropionate 0.05 % lotion, Apply topically 2 (two) times a day (Patient not taking: Reported on 4/8/2024), Disp: 60 mL, Rfl: 0    ACTIVE PROBLEM LIST:  Patient Active Problem List   Diagnosis    Arthralgia    Erectile dysfunction of non-organic origin    Esophageal reflux    Generalized anxiety disorder    Gout    Hyperlipidemia    Hypertension    Lumbar radiculopathy    Abnormal radiographic examination    Obesity    Lumbar pain    Polycythemia    Smoking       PAST MEDICAL HISTORY:  Past Medical History:   Diagnosis Date    Hyperlipemia     Hypertension        PAST SURGICAL HISTORY:  Past Surgical History:   Procedure Laterality Date    EGD         FAMILY HISTORY:  Family History   Problem Relation Age of Onset    Cancer Mother     Aneurysm Mother     Heart disease Father     Coronary artery disease Father     Diabetes Family        SOCIAL HISTORY:  Social History     Socioeconomic History    Marital status: Single     Spouse name: Not on file    Number of children: Not on file    Years of education: Not on file    Highest education level: Not on file   Occupational History    Not on file   Tobacco Use    Smoking status: Never    Smokeless tobacco: Never   Vaping Use    Vaping status: Never Used   Substance and Sexual Activity    Alcohol use: Yes     Comment: 2-3 a day    Drug use: Yes     Types: Marijuana    Sexual activity: Not Currently   Other Topics Concern    Not on file   Social History Narrative    Not on file     Social Determinants of Health     Financial Resource Strain: Not on file    Food Insecurity: Not on file   Transportation Needs: Not on file   Physical Activity: Not on file   Stress: Not on file   Social Connections: Not on file   Intimate Partner Violence: Not on file   Housing Stability: Not on file       Review of Systems   HENT:  Negative for ear pain and sore throat.    Respiratory:  Negative for cough and shortness of breath.    All other systems reviewed and are negative.        Objective:  Vitals:    04/08/24 1054   BP: 144/90   BP Location: Left arm   Patient Position: Sitting   Cuff Size: Adult   Pulse: 78   Temp: 98.8 °F (37.1 °C)   TempSrc: Tympanic   SpO2: 99%     There is no height or weight on file to calculate BMI.     Physical Exam  Vitals and nursing note reviewed.   Constitutional:       Appearance: Normal appearance. He is obese.   HENT:      Head: Normocephalic and atraumatic.      Nose: Congestion present.      Mouth/Throat:      Mouth: Mucous membranes are moist.   Pulmonary:      Effort: Pulmonary effort is normal.   Musculoskeletal:         General: Normal range of motion.      Cervical back: Normal range of motion.   Skin:     General: Skin is warm and dry.   Neurological:      General: No focal deficit present.      Mental Status: He is alert and oriented to person, place, and time. Mental status is at baseline.   Psychiatric:         Mood and Affect: Mood normal.           RESULTS:  Hemoglobin A1C   Date/Time Value Ref Range Status   04/19/2023 10:49 AM 5.9 (H) Normal 3.8-5.6%; PreDiabetic 5.7-6.4%; Diabetic >=6.5%; Glycemic control for adults with diabetes <7.0% % Final     Cholesterol   Date/Time Value Ref Range Status   04/19/2023 10:49  See Comment mg/dL Final     Comment:     Cholesterol:         Pediatric <18 Years        Desirable          <170 mg/dL      Borderline High    170-199 mg/dL      High               >=200 mg/dL        Adult >=18 Years            Desirable        <200 mg/dL      Borderline High  200-239 mg/dL      High             >239  mg/dL       Triglycerides   Date/Time Value Ref Range Status   04/19/2023 10:49  See Comment mg/dL Final     Comment:     Triglyceride:     0-9Y            <75mg/dL     10Y-17Y         <90 mg/dL       >=18Y     Normal          <150 mg/dL     Borderline High 150-199 mg/dL     High            200-499 mg/dL        Very High       >499 mg/dL    Specimen collection should occur prior to N-Acetylcysteine or Metamizole administration due to the potential for falsely depressed results.   12/09/2015 10:08  mg/dL Final     Comment:     TRIGLYCERIDE:       Normal              <150 mg/dl       Borderline High    150-199 mg/dl       High               200-499 mg/dl       Very High          >499 mg/dl  _______________________________________  The above 1 analytes were performed by [facility]  [address] [place] [id number]       HDL, Direct   Date/Time Value Ref Range Status   04/19/2023 10:49 AM 45 >=40 mg/dL Final     LDL Calculated   Date/Time Value Ref Range Status   04/19/2023 10:49 AM 89 0 - 100 mg/dL Final     Comment:     LDL Cholesterol:     Optimal           <100 mg/dl     Near Optimal      100-129 mg/dl     Above Optimal       Borderline High 130-159 mg/dl       High            160-189 mg/dl       Very High       >189 mg/dl         This screening LDL is a calculated result.   It does not have the accuracy of the Direct Measured LDL in the monitoring of patients with hyperlipidemia and/or statin therapy.   Direct Measure LDL (EJG489) must be ordered separately in these patients.     Hemoglobin   Date/Time Value Ref Range Status   04/19/2023 10:49 AM 15.2 12.0 - 17.0 g/dL Final   12/09/2015 10:08 AM 17.2 (H) 12.0 - 17.0 g/dL Final     Hematocrit   Date/Time Value Ref Range Status   04/19/2023 10:49 AM 45.0 36.5 - 49.3 % Final   12/09/2015 10:08 AM 49.2 36.5 - 49.3 % Final     Platelets   Date/Time Value Ref Range Status   04/19/2023 10:49  149 - 390 Thousands/uL Final   12/09/2015 10:08  149  - 390 Thousand/uL Final     PSA   Date/Time Value Ref Range Status   12/09/2015 10:08 AM 0.9 0.0 - 4.0 ng/mL Final     Comment:     PSA values from one laboratory may not be comparable to those from  another because of the various testing technologies employed.  Additionally, PSA results can not be interpreted as absolute evidence of  the presence or absence of disease. This PSA value was obtained by [employer] Chemiluminometric Immunoassay.  The above 1 analytes were [facility] [address]       Prostate Specific Antigen Total   Date/Time Value Ref Range Status   04/19/2023 10:49 AM 2.9 0.0 - 4.0 ng/mL Final     Comment:     Roche ECLIA methodology.  According to the American Urological Association, Serum PSA should  decrease and remain at undetectable levels after radical  prostatectomy. The AUA defines biochemical recurrence as an initial  PSA value 0.2 ng/mL or greater followed by a subsequent confirmatory  PSA value 0.2 ng/mL or greater.  Values obtained with different assay methods or kits cannot be used  interchangeably. Results cannot be interpreted as absolute evidence  of the presence or absence of malignant disease.     TSH 3RD GENERATON   Date/Time Value Ref Range Status   04/19/2023 10:49 AM 2.405 0.450 - 4.500 uIU/mL Final     Comment:     Adult TSH (3rd generation) reference range follows the recommended guidelines of the American Thyroid Association, January, 2020.   12/09/2015 10:08 AM 2.680 0.358 - 3.740 uIU/ML Final     Comment:     *Patients undergoing fluorescein dye angiography may retain small  amounts of fluorescein in the body for 48-72 hours post procedure.  Samples containing fluorescein can produce falsely depressed TSH   values. If the patient had this procedure, a specimen should be  resubmitted post fluorescein clearance.  The above 1 analytes were performed by [facility],[place] [address]       Free T4   Date/Time Value Ref  Range Status   05/31/2018 11:34 AM 0.86 0.76 - 1.46 ng/dL Final     Comment:       Specimen collection should occur prior to Sulfasalazine administration due to the potential for falsely elevated results.     Sodium   Date/Time Value Ref Range Status   04/19/2023 10:49  135 - 147 mmol/L Final     BUN   Date/Time Value Ref Range Status   04/19/2023 10:49 AM 17 5 - 25 mg/dL Final   12/09/2015 10:08 AM 14 5 - 25 mg/dL Final     Creatinine   Date/Time Value Ref Range Status   04/19/2023 10:49 AM 0.84 0.60 - 1.30 mg/dL Final     Comment:     Standardized to IDMS reference method   12/09/2015 10:08 AM 0.97 0.60 - 1.30 mg/dL Final     Comment:     Standardized to IDMS reference method      In chart    This note was created with voice recognition software.  Phonic, grammatical and spelling errors may be present within the note as a result.

## 2024-06-28 DIAGNOSIS — J01.10 ACUTE NON-RECURRENT FRONTAL SINUSITIS: ICD-10-CM

## 2024-06-28 DIAGNOSIS — I10 HYPERTENSION, UNSPECIFIED TYPE: ICD-10-CM

## 2024-06-28 RX ORDER — METOPROLOL SUCCINATE 100 MG/1
100 TABLET, EXTENDED RELEASE ORAL DAILY
Qty: 90 TABLET | Refills: 1 | Status: SHIPPED | OUTPATIENT
Start: 2024-06-28

## 2024-06-28 RX ORDER — LORATADINE 10 MG/1
TABLET ORAL
Qty: 90 TABLET | Refills: 1 | Status: SHIPPED | OUTPATIENT
Start: 2024-06-28

## 2024-06-28 NOTE — TELEPHONE ENCOUNTER
Reason for call:   [x] Refill   [] Prior Auth  [] Other:     Office:   [x] PCP/Provider - [facility]  [] Specialty/Provider -     Medication:      Does the patient have enough for 3 days?   [] Yes   [x] No - Send as HP to POD

## 2024-08-01 DIAGNOSIS — E78.5 HYPERLIPIDEMIA, UNSPECIFIED HYPERLIPIDEMIA TYPE: ICD-10-CM

## 2024-08-01 DIAGNOSIS — I10 HYPERTENSION, UNSPECIFIED TYPE: ICD-10-CM

## 2024-08-01 DIAGNOSIS — I10 ESSENTIAL HYPERTENSION: ICD-10-CM

## 2024-08-01 RX ORDER — ATORVASTATIN CALCIUM 20 MG/1
20 TABLET, FILM COATED ORAL DAILY
Qty: 30 TABLET | Refills: 0 | Status: SHIPPED | OUTPATIENT
Start: 2024-08-01

## 2024-08-01 RX ORDER — AMLODIPINE BESYLATE 10 MG/1
10 TABLET ORAL DAILY
Qty: 30 TABLET | Refills: 0 | Status: SHIPPED | OUTPATIENT
Start: 2024-08-01

## 2024-08-01 RX ORDER — LISINOPRIL 40 MG/1
40 TABLET ORAL DAILY
Qty: 30 TABLET | Refills: 0 | Status: SHIPPED | OUTPATIENT
Start: 2024-08-01

## 2024-08-01 NOTE — TELEPHONE ENCOUNTER
Reason for call:   [x] Refill   [] Prior Auth  [] Other:     Office:   [x] PCP/Provider - [facility] / Jarocho Thomas MD   [] Specialty/Provider -     Medication:       Does the patient have enough for 3 days?   [x] Yes   [] No - Send as HP to POD  
engaged minimally in vrbal discussion; used hand signals and yes or no answers/Abnormal as indicated, otherwise normal...

## 2024-09-03 DIAGNOSIS — E78.5 HYPERLIPIDEMIA, UNSPECIFIED HYPERLIPIDEMIA TYPE: ICD-10-CM

## 2024-09-03 DIAGNOSIS — I10 ESSENTIAL HYPERTENSION: ICD-10-CM

## 2024-09-03 DIAGNOSIS — I10 HYPERTENSION, UNSPECIFIED TYPE: ICD-10-CM

## 2024-09-03 RX ORDER — LISINOPRIL 40 MG/1
40 TABLET ORAL DAILY
Qty: 30 TABLET | Refills: 2 | Status: SHIPPED | OUTPATIENT
Start: 2024-09-03

## 2024-09-03 RX ORDER — AMLODIPINE BESYLATE 10 MG/1
10 TABLET ORAL DAILY
Qty: 30 TABLET | Refills: 2 | Status: SHIPPED | OUTPATIENT
Start: 2024-09-03

## 2024-09-03 RX ORDER — ATORVASTATIN CALCIUM 20 MG/1
20 TABLET, FILM COATED ORAL DAILY
Qty: 30 TABLET | Refills: 2 | Status: SHIPPED | OUTPATIENT
Start: 2024-09-03

## 2024-12-02 ENCOUNTER — TELEPHONE (OUTPATIENT)
Age: 59
End: 2024-12-02

## 2024-12-02 NOTE — TELEPHONE ENCOUNTER
Patient scheduled for clogged ears tomorrow at 9:40. Requesting ear drops ordered to   [id number] - [place], [place] - [address]   Prior.

## 2024-12-03 ENCOUNTER — OFFICE VISIT (OUTPATIENT)
Dept: INTERNAL MEDICINE CLINIC | Facility: CLINIC | Age: 59
End: 2024-12-03
Payer: COMMERCIAL

## 2024-12-03 VITALS
DIASTOLIC BLOOD PRESSURE: 74 MMHG | HEIGHT: 72 IN | SYSTOLIC BLOOD PRESSURE: 148 MMHG | RESPIRATION RATE: 16 BRPM | HEART RATE: 74 BPM | OXYGEN SATURATION: 94 % | BODY MASS INDEX: 34.95 KG/M2 | WEIGHT: 258 LBS | TEMPERATURE: 100.8 F

## 2024-12-03 DIAGNOSIS — H66.90 ACUTE OTITIS MEDIA, UNSPECIFIED OTITIS MEDIA TYPE: Primary | ICD-10-CM

## 2024-12-03 DIAGNOSIS — H65.01 NON-RECURRENT ACUTE SEROUS OTITIS MEDIA OF RIGHT EAR: ICD-10-CM

## 2024-12-03 DIAGNOSIS — J02.9 SORE THROAT: ICD-10-CM

## 2024-12-03 DIAGNOSIS — H61.22 HEARING LOSS OF LEFT EAR DUE TO CERUMEN IMPACTION: ICD-10-CM

## 2024-12-03 PROCEDURE — 99213 OFFICE O/P EST LOW 20 MIN: CPT | Performed by: PHYSICIAN ASSISTANT

## 2024-12-03 PROCEDURE — 69210 REMOVE IMPACTED EAR WAX UNI: CPT | Performed by: PHYSICIAN ASSISTANT

## 2024-12-03 NOTE — PROGRESS NOTES
"Name: Ferdinand Menon      : 1965      MRN: [id number]  Encounter Provider: Mikael Romero PA-C  Encounter Date: 12/3/2024   Encounter department: [facility]NAL MEDICINE [place]  :  Assessment & Plan  Acute otitis media, unspecified otitis media type    Orders:    amoxicillin-clavulanate (AUGMENTIN) 875-125 mg per tablet; Take 1 tablet by mouth every 12 (twelve) hours for 10 days    Sore throat    Orders:    amoxicillin-clavulanate (AUGMENTIN) 875-125 mg per tablet; Take 1 tablet by mouth every 12 (twelve) hours for 10 days    Non-recurrent acute serous otitis media of right ear    Orders:    amoxicillin-clavulanate (AUGMENTIN) 875-125 mg per tablet; Take 1 tablet by mouth every 12 (twelve) hours for 10 days    Hearing loss of left ear due to cerumen impaction               Depression Screening and Follow-up Plan: Patient was screened for depression during today's encounter. They screened negative with a PHQ-2 score of 0.      History of Present Illness     Acute visit    Both ears feel \"plugged\".  States he had a cold about 3 weeks ago, continues with sore throat, cannot hear out of left ear.  Has been using ear softening drops.  Low-grade fever.  Previous cough has improved.      Review of Systems   Constitutional:  Negative for activity change, chills, fatigue and fever.   HENT:  Positive for congestion, ear pain, hearing loss, rhinorrhea, sinus pressure and sore throat. Negative for voice change.    Eyes:  Negative for discharge and itching.   Respiratory:  Negative for cough, chest tightness and shortness of breath.    Cardiovascular:  Negative for chest pain, palpitations and leg swelling.   Gastrointestinal:  Negative for abdominal pain, blood in stool, constipation, diarrhea, nausea and vomiting.   Genitourinary:  Negative for difficulty urinating.   Musculoskeletal:  Negative for arthralgias and myalgias.   Skin:  Negative for rash.   Allergic/Immunologic: Negative for immunocompromised " state.   Neurological:  Negative for dizziness, syncope, weakness, light-headedness and headaches.   Hematological:  Negative for adenopathy. Does not bruise/bleed easily.   Psychiatric/Behavioral:  Negative for dysphoric mood. The patient is not nervous/anxious.           Objective   /74 (BP Location: Left arm, Patient Position: Sitting, Cuff Size: Adult)   Pulse 74   Temp (!) 100.8 °F (38.2 °C) (Tympanic)   Resp 16   Ht 6' (1.829 m)   Wt 117 kg (258 lb)   SpO2 94%   BMI 34.99 kg/m²      Physical Exam  Vitals and nursing note reviewed.   Constitutional:       General: He is not in acute distress.     Appearance: Normal appearance. He is well-developed. He is not ill-appearing.   HENT:      Head: Normocephalic.      Right Ear: Ear canal normal. A middle ear effusion is present.      Left Ear: Ear canal normal. A middle ear effusion is present. Tympanic membrane is erythematous.      Nose: Congestion present.      Mouth/Throat:      Mouth: Mucous membranes are moist.      Pharynx: Posterior oropharyngeal erythema present. No oropharyngeal exudate or uvula swelling.      Tonsils: No tonsillar exudate or tonsillar abscesses.   Eyes:      Extraocular Movements: Extraocular movements intact.      Conjunctiva/sclera: Conjunctivae normal.      Pupils: Pupils are equal, round, and reactive to light.   Neck:      Thyroid: No thyromegaly.      Vascular: No carotid bruit or JVD.   Cardiovascular:      Rate and Rhythm: Normal rate and regular rhythm.      Heart sounds: Normal heart sounds.   Pulmonary:      Effort: Pulmonary effort is normal.      Breath sounds: Normal breath sounds.   Abdominal:      Palpations: Abdomen is soft.   Musculoskeletal:         General: No swelling.      Cervical back: Normal range of motion and neck supple.      Right lower leg: No edema.      Left lower leg: No edema.   Lymphadenopathy:      Cervical: No cervical adenopathy.   Skin:     General: Skin is warm and dry.      Findings: No  rash.   Neurological:      General: No focal deficit present.      Mental Status: He is alert and oriented to person, place, and time. Mental status is at baseline.   Psychiatric:         Mood and Affect: Mood normal.         Behavior: Behavior normal.         Thought Content: Thought content normal.     Ear cerumen removal    Date/Time: 12/3/2024 9:40 AM    Performed by: Mikael Romero PA-C  Authorized by: Mikael Romero PA-C  Universal Protocol:  procedure performed by consultantConsent: Verbal consent obtained.  Risks and benefits: risks, benefits and alternatives were discussed  Consent given by: patient  Patient understanding: patient states understanding of the procedure being performed  Patient identity confirmed: verbally with patient    Patient location:  Clinic  Procedure details:     Local anesthetic:  None    Location:  L ear    Procedure type: curette      Approach:  External and natural orifice    Visualization (free text):  Otoscope  Post-procedure details:     Complication:  None    Hearing quality:  Improved    Patient tolerance of procedure:  Tolerated well, no immediate complications  Comments:      Post procedure left tympanic membrane is injected and erythematous along with retracted.

## 2024-12-03 NOTE — PATIENT INSTRUCTIONS
Start antibiotic, take with food.  Always wise to take a probiotic and or eat yogurt daily when on an antibiotic.  Keep us informed if not steadily improving.

## 2024-12-09 DIAGNOSIS — I10 HYPERTENSION, UNSPECIFIED TYPE: ICD-10-CM

## 2024-12-09 DIAGNOSIS — I10 ESSENTIAL HYPERTENSION: ICD-10-CM

## 2024-12-10 RX ORDER — LISINOPRIL 40 MG/1
40 TABLET ORAL DAILY
Qty: 30 TABLET | Refills: 0 | Status: SHIPPED | OUTPATIENT
Start: 2024-12-10

## 2024-12-10 RX ORDER — AMLODIPINE BESYLATE 10 MG/1
10 TABLET ORAL DAILY
Qty: 30 TABLET | Refills: 5 | Status: SHIPPED | OUTPATIENT
Start: 2024-12-10

## 2024-12-30 DIAGNOSIS — I10 HYPERTENSION, UNSPECIFIED TYPE: ICD-10-CM

## 2024-12-30 DIAGNOSIS — J01.10 ACUTE NON-RECURRENT FRONTAL SINUSITIS: ICD-10-CM

## 2024-12-31 DIAGNOSIS — I10 HYPERTENSION, UNSPECIFIED TYPE: ICD-10-CM

## 2024-12-31 RX ORDER — LORATADINE 10 MG/1
TABLET ORAL
Qty: 90 TABLET | Refills: 1 | Status: SHIPPED | OUTPATIENT
Start: 2024-12-31

## 2024-12-31 RX ORDER — METOPROLOL SUCCINATE 100 MG/1
100 TABLET, EXTENDED RELEASE ORAL DAILY
Qty: 90 TABLET | Refills: 1 | Status: SHIPPED | OUTPATIENT
Start: 2024-12-31

## 2025-01-09 DIAGNOSIS — I10 HYPERTENSION, UNSPECIFIED TYPE: ICD-10-CM

## 2025-01-09 DIAGNOSIS — E78.5 HYPERLIPIDEMIA, UNSPECIFIED HYPERLIPIDEMIA TYPE: ICD-10-CM

## 2025-01-10 RX ORDER — ATORVASTATIN CALCIUM 20 MG/1
20 TABLET, FILM COATED ORAL DAILY
Qty: 30 TABLET | Refills: 0 | Status: SHIPPED | OUTPATIENT
Start: 2025-01-10

## 2025-01-13 RX ORDER — LISINOPRIL 40 MG/1
40 TABLET ORAL DAILY
Qty: 30 TABLET | Refills: 0 | Status: SHIPPED | OUTPATIENT
Start: 2025-01-13

## 2025-02-19 DIAGNOSIS — I10 HYPERTENSION, UNSPECIFIED TYPE: ICD-10-CM

## 2025-02-19 DIAGNOSIS — E78.5 HYPERLIPIDEMIA, UNSPECIFIED HYPERLIPIDEMIA TYPE: ICD-10-CM

## 2025-02-19 RX ORDER — ATORVASTATIN CALCIUM 20 MG/1
20 TABLET, FILM COATED ORAL DAILY
Qty: 30 TABLET | Refills: 0 | Status: SHIPPED | OUTPATIENT
Start: 2025-02-19

## 2025-02-19 RX ORDER — LISINOPRIL 40 MG/1
40 TABLET ORAL DAILY
Qty: 30 TABLET | Refills: 0 | Status: SHIPPED | OUTPATIENT
Start: 2025-02-19

## 2025-02-19 NOTE — TELEPHONE ENCOUNTER
PATIENT IS ASKING FOR REFILLS TO BE ORDERED TO GET UNTIL APPOINTMENT 5/6    Medication: atorvastatin (LIPITOR) 20 mg tablet     Dose/Frequency: Take 1 tablet (20 mg total) by mouth daily     Quantity: 30 tablet     Pharmacy: [facility] # [address] [place] [address] [id number]     Office:   [x] PCP/Provider -   [] Speciality/Provider -     Does the patient have enough for 3 days?   [] Yes   [x] No - Send as HP to POD    Medication: lisinopril (ZESTRIL) 40 mg tablet     Dose/Frequency: Take 1 tablet (40 mg total) by mouth daily     Quantity: 30 tablet     Pharmacy: [facility] # [address] [place] [address] [id number]     Office:   [x] PCP/Provider -   [] Speciality/Provider -     Does the patient have enough for 3 days?   [] Yes   [x] No - Send as HP to POD

## 2025-02-20 ENCOUNTER — OFFICE VISIT (OUTPATIENT)
Dept: INTERNAL MEDICINE CLINIC | Facility: CLINIC | Age: 60
End: 2025-02-20
Payer: COMMERCIAL

## 2025-02-20 VITALS
SYSTOLIC BLOOD PRESSURE: 130 MMHG | BODY MASS INDEX: 34.99 KG/M2 | DIASTOLIC BLOOD PRESSURE: 80 MMHG | TEMPERATURE: 97.5 F | OXYGEN SATURATION: 97 % | HEIGHT: 72 IN | HEART RATE: 80 BPM

## 2025-02-20 DIAGNOSIS — R05.1 ACUTE COUGH: Primary | ICD-10-CM

## 2025-02-20 DIAGNOSIS — J01.00 SUBACUTE MAXILLARY SINUSITIS: ICD-10-CM

## 2025-02-20 PROCEDURE — 99213 OFFICE O/P EST LOW 20 MIN: CPT

## 2025-02-20 RX ORDER — METHYLPREDNISOLONE 4 MG/1
TABLET ORAL
Qty: 21 EACH | Refills: 0 | Status: SHIPPED | OUTPATIENT
Start: 2025-02-20

## 2025-02-20 RX ORDER — AZITHROMYCIN 250 MG/1
TABLET, FILM COATED ORAL
Qty: 6 TABLET | Refills: 0 | Status: SHIPPED | OUTPATIENT
Start: 2025-02-20 | End: 2025-02-25

## 2025-02-20 NOTE — PROGRESS NOTES
Name: Ferdinand Menon      : 1965      MRN: [id number]  Encounter Provider: ROB Keller  Encounter Date: 2025   Encounter department: [facility] INTERNAL MEDICINE [place]  :  Assessment & Plan  Subacute maxillary sinusitis  Successfully treated in the past with medrol dosepak as well as azithromycin. Advised patient to hydrate.  Orders:  •  methylPREDNISolone 4 MG tablet therapy pack; Use as directed on package  •  azithromycin (Zithromax) 250 mg tablet; Take 2 tablets (500 mg total) by mouth daily for 1 day, THEN 1 tablet (250 mg total) daily for 4 days.    Acute cough    Orders:  •  methylPREDNISolone 4 MG tablet therapy pack; Use as directed on package  •  azithromycin (Zithromax) 250 mg tablet; Take 2 tablets (500 mg total) by mouth daily for 1 day, THEN 1 tablet (250 mg total) daily for 4 days.           History of Present Illness {?Quick Links Encounters * My Last Note * Last Note in Specialty * Snapshot * Since Last Visit * History :29569}  Ferdinand is here today with complaints of URI symptoms last week. They have since resolved and struggling with cough, sore throat, and headache. This has been going on for greater than 1 week. He's been sick three times this winter and he's very upset that this has been reoccurring. He's addiment in receiving antibiotics, but not Augmentin. He has taken Augmentin in December and was extremely intolerant to it with significant diarrhea.    Cough  Associated symptoms include postnasal drip and a sore throat. Pertinent negatives include no chest pain, chills, ear pain, fever, headaches, myalgias, rash, rhinorrhea or shortness of breath.     Review of Systems   Constitutional:  Negative for chills and fever.   HENT:  Positive for postnasal drip and sore throat. Negative for congestion, ear pain, rhinorrhea and sinus pressure.    Eyes:  Negative for pain and visual disturbance.   Respiratory:  Positive for cough. Negative for chest tightness and  shortness of breath.    Cardiovascular:  Negative for chest pain, palpitations and leg swelling.   Gastrointestinal:  Negative for abdominal pain, constipation, diarrhea, nausea and vomiting.   Endocrine: Negative.    Genitourinary:  Negative for dysuria, frequency and hematuria.   Musculoskeletal:  Negative for arthralgias, back pain and myalgias.   Skin:  Negative for color change and rash.   Allergic/Immunologic: Negative.    Neurological:  Negative for dizziness, seizures, syncope, light-headedness and headaches.   Hematological: Negative.    Psychiatric/Behavioral: Negative.     All other systems reviewed and are negative.      Objective {?Quick Links Trend Vitals * Enter New Vitals * Results Review * Timeline (Adult) * Labs * Imaging * Cardiology * Procedures * Lung Cancer Screening * Surgical eConsent :[id number]}  /80 (BP Location: Right arm, Patient Position: Sitting, Cuff Size: Large)   Pulse 80   Temp 97.5 °F (36.4 °C) (Tympanic)   Ht 6' (1.829 m)   SpO2 97%   BMI 34.99 kg/m²      Physical Exam  Vitals and nursing note reviewed.   Constitutional:       General: He is not in acute distress.     Appearance: He is well-developed.   HENT:      Right Ear: Tympanic membrane normal.      Left Ear: Tympanic membrane normal.      Nose: Congestion present.      Mouth/Throat:      Pharynx: Postnasal drip present. No pharyngeal swelling.      Tonsils: No tonsillar exudate.   Cardiovascular:      Rate and Rhythm: Normal rate and regular rhythm.      Pulses: Normal pulses.      Heart sounds: Normal heart sounds. No murmur heard.  Pulmonary:      Effort: Pulmonary effort is normal. No respiratory distress.      Breath sounds: Normal breath sounds.   Abdominal:      General: Bowel sounds are normal.      Palpations: Abdomen is soft.      Tenderness: There is no abdominal tenderness.   Musculoskeletal:         General: No swelling.      Cervical back: Neck supple.   Skin:     General: Skin is warm and dry.    Neurological:      Mental Status: He is alert and oriented to person, place, and time.   Psychiatric:         Mood and Affect: Mood normal.

## 2025-03-17 DIAGNOSIS — E78.5 HYPERLIPIDEMIA, UNSPECIFIED HYPERLIPIDEMIA TYPE: ICD-10-CM

## 2025-03-17 DIAGNOSIS — I10 HYPERTENSION, UNSPECIFIED TYPE: ICD-10-CM

## 2025-03-17 RX ORDER — ATORVASTATIN CALCIUM 20 MG/1
20 TABLET, FILM COATED ORAL DAILY
Qty: 30 TABLET | Refills: 0 | Status: SHIPPED | OUTPATIENT
Start: 2025-03-17

## 2025-03-17 RX ORDER — LISINOPRIL 40 MG/1
40 TABLET ORAL DAILY
Qty: 30 TABLET | Refills: 0 | Status: SHIPPED | OUTPATIENT
Start: 2025-03-17

## 2025-04-15 DIAGNOSIS — E78.5 HYPERLIPIDEMIA, UNSPECIFIED HYPERLIPIDEMIA TYPE: ICD-10-CM

## 2025-04-15 DIAGNOSIS — I10 HYPERTENSION, UNSPECIFIED TYPE: ICD-10-CM

## 2025-04-15 RX ORDER — ATORVASTATIN CALCIUM 20 MG/1
20 TABLET, FILM COATED ORAL DAILY
Qty: 90 TABLET | Refills: 0 | Status: SHIPPED | OUTPATIENT
Start: 2025-04-15

## 2025-04-15 RX ORDER — LISINOPRIL 40 MG/1
40 TABLET ORAL DAILY
Qty: 90 TABLET | Refills: 0 | Status: SHIPPED | OUTPATIENT
Start: 2025-04-15

## 2025-05-11 ENCOUNTER — HOSPITAL ENCOUNTER (EMERGENCY)
Facility: HOSPITAL | Age: 60
Discharge: HOME | End: 2025-05-11
Attending: EMERGENCY MEDICINE | Admitting: EMERGENCY MEDICINE
Payer: COMMERCIAL

## 2025-05-11 ENCOUNTER — APPOINTMENT (EMERGENCY)
Dept: CT IMAGING | Facility: HOSPITAL | Age: 60
End: 2025-05-11
Payer: COMMERCIAL

## 2025-05-11 ENCOUNTER — APPOINTMENT (EMERGENCY)
Dept: RADIOLOGY | Facility: HOSPITAL | Age: 60
End: 2025-05-11
Payer: COMMERCIAL

## 2025-05-11 VITALS
OXYGEN SATURATION: 98 % | HEART RATE: 83 BPM | SYSTOLIC BLOOD PRESSURE: 161 MMHG | RESPIRATION RATE: 12 BRPM | DIASTOLIC BLOOD PRESSURE: 92 MMHG | TEMPERATURE: 98.6 F

## 2025-05-11 DIAGNOSIS — R06.00 DYSPNEA: Primary | ICD-10-CM

## 2025-05-11 DIAGNOSIS — R11.10 VOMITING: ICD-10-CM

## 2025-05-11 DIAGNOSIS — R19.7 DIARRHEA: ICD-10-CM

## 2025-05-11 LAB
2HR DELTA HS TROPONIN: 0 NG/L
ALBUMIN SERPL BCG-MCNC: 4.5 G/DL (ref 3.5–5)
ALP SERPL-CCNC: 70 U/L (ref 34–104)
ALT SERPL W P-5'-P-CCNC: 22 U/L (ref 7–52)
ANION GAP SERPL CALCULATED.3IONS-SCNC: 12 MMOL/L (ref 4–13)
APTT PPP: 31 SECONDS (ref 23–34)
AST SERPL W P-5'-P-CCNC: 16 U/L (ref 13–39)
ATRIAL RATE: 78 BPM
ATRIAL RATE: 92 BPM
BASOPHILS # BLD AUTO: 0.04 THOUSANDS/ÂΜL (ref 0–0.1)
BASOPHILS NFR BLD AUTO: 0 % (ref 0–1)
BILIRUB DIRECT SERPL-MCNC: 0.18 MG/DL (ref 0–0.2)
BILIRUB SERPL-MCNC: 0.97 MG/DL (ref 0.2–1)
BNP SERPL-MCNC: 9 PG/ML (ref 0–100)
BUN SERPL-MCNC: 21 MG/DL (ref 5–25)
CALCIUM SERPL-MCNC: 10 MG/DL (ref 8.4–10.2)
CARDIAC TROPONIN I PNL SERPL HS: 6 NG/L (ref ?–50)
CARDIAC TROPONIN I PNL SERPL HS: 6 NG/L (ref ?–50)
CHLORIDE SERPL-SCNC: 103 MMOL/L (ref 96–108)
CO2 SERPL-SCNC: 22 MMOL/L (ref 21–32)
CREAT SERPL-MCNC: 1.1 MG/DL (ref 0.6–1.3)
EOSINOPHIL # BLD AUTO: 0.2 THOUSAND/ÂΜL (ref 0–0.61)
EOSINOPHIL NFR BLD AUTO: 2 % (ref 0–6)
ERYTHROCYTE [DISTWIDTH] IN BLOOD BY AUTOMATED COUNT: 12.3 % (ref 11.6–15.1)
FLUAV AG UPPER RESP QL IA.RAPID: NEGATIVE
FLUBV AG UPPER RESP QL IA.RAPID: NEGATIVE
GFR SERPL CREATININE-BSD FRML MDRD: 72 ML/MIN/1.73SQ M
GLUCOSE SERPL-MCNC: 204 MG/DL (ref 65–140)
GLUCOSE SERPL-MCNC: 207 MG/DL (ref 65–140)
HCT VFR BLD AUTO: 47.4 % (ref 36.5–49.3)
HGB BLD-MCNC: 16 G/DL (ref 12–17)
IMM GRANULOCYTES # BLD AUTO: 0.02 THOUSAND/UL (ref 0–0.2)
IMM GRANULOCYTES NFR BLD AUTO: 0 % (ref 0–2)
INR PPP: 1.02 (ref 0.85–1.19)
LACTATE SERPL-SCNC: 2.8 MMOL/L (ref 0.5–2)
LYMPHOCYTES # BLD AUTO: 2.33 THOUSANDS/ÂΜL (ref 0.6–4.47)
LYMPHOCYTES NFR BLD AUTO: 25 % (ref 14–44)
MCH RBC QN AUTO: 31.9 PG (ref 26.8–34.3)
MCHC RBC AUTO-ENTMCNC: 33.8 G/DL (ref 31.4–37.4)
MCV RBC AUTO: 94 FL (ref 82–98)
MONOCYTES # BLD AUTO: 0.67 THOUSAND/ÂΜL (ref 0.17–1.22)
MONOCYTES NFR BLD AUTO: 7 % (ref 4–12)
NEUTROPHILS # BLD AUTO: 6 THOUSANDS/ÂΜL (ref 1.85–7.62)
NEUTS SEG NFR BLD AUTO: 66 % (ref 43–75)
NRBC BLD AUTO-RTO: 0 /100 WBCS
P AXIS: 60 DEGREES
P AXIS: 65 DEGREES
PLATELET # BLD AUTO: 260 THOUSANDS/UL (ref 149–390)
PMV BLD AUTO: 11 FL (ref 8.9–12.7)
POTASSIUM SERPL-SCNC: 3.4 MMOL/L (ref 3.5–5.3)
PR INTERVAL: 146 MS
PR INTERVAL: 150 MS
PROT SERPL-MCNC: 8 G/DL (ref 6.4–8.4)
PROTHROMBIN TIME: 14.1 SECONDS (ref 12.3–15)
QRS AXIS: -40 DEGREES
QRS AXIS: -55 DEGREES
QRSD INTERVAL: 94 MS
QRSD INTERVAL: 94 MS
QT INTERVAL: 372 MS
QT INTERVAL: 404 MS
QTC INTERVAL: 460 MS
QTC INTERVAL: 460 MS
RBC # BLD AUTO: 5.02 MILLION/UL (ref 3.88–5.62)
SARS-COV+SARS-COV-2 AG RESP QL IA.RAPID: NEGATIVE
SODIUM SERPL-SCNC: 137 MMOL/L (ref 135–147)
T WAVE AXIS: 2 DEGREES
T WAVE AXIS: 30 DEGREES
VENTRICULAR RATE: 78 BPM
VENTRICULAR RATE: 92 BPM
WBC # BLD AUTO: 9.26 THOUSAND/UL (ref 4.31–10.16)

## 2025-05-11 PROCEDURE — 36415 COLL VENOUS BLD VENIPUNCTURE: CPT | Performed by: EMERGENCY MEDICINE

## 2025-05-11 PROCEDURE — 80048 BASIC METABOLIC PNL TOTAL CA: CPT | Performed by: EMERGENCY MEDICINE

## 2025-05-11 PROCEDURE — 71275 CT ANGIOGRAPHY CHEST: CPT

## 2025-05-11 PROCEDURE — 93010 ELECTROCARDIOGRAM REPORT: CPT | Performed by: INTERNAL MEDICINE

## 2025-05-11 PROCEDURE — 85730 THROMBOPLASTIN TIME PARTIAL: CPT | Performed by: EMERGENCY MEDICINE

## 2025-05-11 PROCEDURE — 85025 COMPLETE CBC W/AUTO DIFF WBC: CPT | Performed by: EMERGENCY MEDICINE

## 2025-05-11 PROCEDURE — 80076 HEPATIC FUNCTION PANEL: CPT | Performed by: EMERGENCY MEDICINE

## 2025-05-11 PROCEDURE — 87811 SARS-COV-2 COVID19 W/OPTIC: CPT | Performed by: EMERGENCY MEDICINE

## 2025-05-11 PROCEDURE — 83605 ASSAY OF LACTIC ACID: CPT | Performed by: EMERGENCY MEDICINE

## 2025-05-11 PROCEDURE — 96360 HYDRATION IV INFUSION INIT: CPT

## 2025-05-11 PROCEDURE — 99285 EMERGENCY DEPT VISIT HI MDM: CPT

## 2025-05-11 PROCEDURE — 93005 ELECTROCARDIOGRAM TRACING: CPT

## 2025-05-11 PROCEDURE — 99285 EMERGENCY DEPT VISIT HI MDM: CPT | Performed by: EMERGENCY MEDICINE

## 2025-05-11 PROCEDURE — 83880 ASSAY OF NATRIURETIC PEPTIDE: CPT | Performed by: EMERGENCY MEDICINE

## 2025-05-11 PROCEDURE — 84484 ASSAY OF TROPONIN QUANT: CPT | Performed by: EMERGENCY MEDICINE

## 2025-05-11 PROCEDURE — 82948 REAGENT STRIP/BLOOD GLUCOSE: CPT

## 2025-05-11 PROCEDURE — 71045 X-RAY EXAM CHEST 1 VIEW: CPT

## 2025-05-11 PROCEDURE — 87804 INFLUENZA ASSAY W/OPTIC: CPT | Performed by: EMERGENCY MEDICINE

## 2025-05-11 PROCEDURE — 85610 PROTHROMBIN TIME: CPT | Performed by: EMERGENCY MEDICINE

## 2025-05-11 RX ORDER — AMOXICILLIN 500 MG/1
1000 CAPSULE ORAL 3 TIMES DAILY
Qty: 42 CAPSULE | Refills: 0 | Status: SHIPPED | OUTPATIENT
Start: 2025-05-11 | End: 2025-05-13

## 2025-05-11 RX ADMIN — SODIUM CHLORIDE 1000 ML: 0.9 INJECTION, SOLUTION INTRAVENOUS at 12:08

## 2025-05-11 RX ADMIN — IOHEXOL 100 ML: 350 INJECTION, SOLUTION INTRAVENOUS at 13:42

## 2025-05-11 NOTE — ED PROVIDER NOTES
Time reflects when diagnosis was documented in both MDM as applicable and the Disposition within this note       Time User Action Codes Description Comment    5/11/2025  3:21 PM Mcpherosn, Reji Add [R06.00] Dyspnea     5/11/2025  3:21 PM Mcpherson, Reji Add [R11.10] Vomiting     5/11/2025  3:22 PM Mcpherson, Reji Add [R19.7] Diarrhea           ED Disposition       ED Disposition   Discharge    Condition   Stable    Date/Time   Sun May 11, 2025  3:21 PM    Comment   Ferdinand Menon discharge to home/self care.                   Assessment & Plan       Medical Decision Making  Problems Addressed:  Diarrhea: acute illness or injury  Dyspnea: acute illness or injury  Vomiting: acute illness or injury    Amount and/or Complexity of Data Reviewed  Labs: ordered.  Radiology: ordered and independent interpretation performed. Decision-making details documented in ED Course.    Risk  Prescription drug management.             Medications   sodium chloride 0.9 % bolus 1,000 mL (0 mL Intravenous Stopped 5/11/25 1312)   iohexol (OMNIPAQUE) 350 MG/ML injection (MULTI-DOSE) 100 mL (100 mL Intravenous Given 5/11/25 1342)       ED Risk Strat Scores                    No data recorded        SBIRT 22yo+      Flowsheet Row Most Recent Value   Initial Alcohol Screen: US AUDIT-C     1. How often do you have a drink containing alcohol? 0 Filed at: 05/11/2025 1155   2. How many drinks containing alcohol do you have on a typical day you are drinking?  0 Filed at: 05/11/2025 1155   3a. Male UNDER 65: How often do you have five or more drinks on one occasion? 0 Filed at: 05/11/2025 1155   Audit-C Score 0 Filed at: 05/11/2025 1154   NILSA: How many times in the past year have you...    Used an illegal drug or used a prescription medication for non-medical reasons? Never Filed at: 05/11/2025 1158                            History of Present Illness       Chief Complaint   Patient presents with    Weakness - Generalized     Ambulatory w/sister  w.complaint of vomiting and diarrhea since Tuesday; further complaint of cough; denies fever and/or chills; complaint of SOB which started Tuesday; denies chest pain       Past Medical History:   Diagnosis Date    Hyperlipemia     Hypertension       Past Surgical History:   Procedure Laterality Date    EGD        Family History   Problem Relation Age of Onset    Cancer Mother     Aneurysm Mother     Heart disease Father     Coronary artery disease Father     Diabetes Family       Social History     Tobacco Use    Smoking status: Never    Smokeless tobacco: Never   Vaping Use    Vaping status: Never Used   Substance Use Topics    Alcohol use: Yes     Comment: Daily    Drug use: Yes     Types: Marijuana      E-Cigarette/Vaping    E-Cigarette Use Never User       E-Cigarette/Vaping Substances    Nicotine No     THC No     CBD No     Flavoring No     Other No     Unknown No       I have reviewed and agree with the history as documented.     60 yo male with several days of dyspnea, fatigue, diarrhea and generalized weakness. No chest pain. + cough. No fever. No abd pain.         Review of Systems   Constitutional:  Positive for fatigue. Negative for fever.   Respiratory:  Positive for cough and shortness of breath.    Neurological:  Positive for weakness.           Objective       ED Triage Vitals   Temperature Pulse Blood Pressure Respirations SpO2 Patient Position - Orthostatic VS   05/11/25 1154 05/11/25 1153 05/11/25 1153 05/11/25 1153 05/11/25 1153 --   98.6 °F (37 °C) 94 167/100 20 100 %       Temp Source Heart Rate Source BP Location FiO2 (%) Pain Score    05/11/25 1154 05/11/25 1230 -- -- 05/11/25 1154    Oral Monitor   No Pain      Vitals      Date and Time Temp Pulse SpO2 Resp BP Pain Score FACES Pain Rating User   05/11/25 1400 -- 83 98 % 12 161/92 -- -- FB   05/11/25 1330 -- 84 98 % 19 158/99 -- -- FB   05/11/25 1300 -- 82 98 % 18 146/85 -- -- KM   05/11/25 1230 -- 88 95 % 15 142/86 -- -- KM   05/11/25 1215  -- 87 94 % 18 146/90 -- -- FB   05/11/25 1154 98.6 °F (37 °C) -- -- -- -- -- -- RO   05/11/25 1154 -- -- -- -- -- No Pain -- TB   05/11/25 1153 -- 94 100 % 20 167/100 -- -- RO            Physical Exam  Vitals and nursing note reviewed.   Constitutional:       General: He is not in acute distress.     Appearance: He is well-developed. He is not ill-appearing, toxic-appearing or diaphoretic.   HENT:      Head: Normocephalic and atraumatic.      Mouth/Throat:      Mouth: Mucous membranes are moist.      Pharynx: Oropharynx is clear.   Eyes:      Conjunctiva/sclera: Conjunctivae normal.      Pupils: Pupils are equal, round, and reactive to light.   Neck:      Vascular: No JVD.   Cardiovascular:      Rate and Rhythm: Normal rate and regular rhythm.      Pulses: Normal pulses.      Heart sounds: Normal heart sounds. No murmur heard.     No friction rub. No gallop.   Pulmonary:      Effort: Pulmonary effort is normal. No respiratory distress.      Breath sounds: Normal breath sounds. No stridor. No wheezing or rales.   Abdominal:      General: There is no distension.      Palpations: Abdomen is soft.      Tenderness: There is no abdominal tenderness. There is no guarding or rebound.   Musculoskeletal:         General: No swelling, tenderness, deformity or signs of injury. Normal range of motion.      Cervical back: Normal range of motion and neck supple. No rigidity.   Skin:     General: Skin is warm and dry.      Capillary Refill: Capillary refill takes less than 2 seconds.      Coloration: Skin is not jaundiced or pale.      Findings: No bruising or erythema.   Neurological:      General: No focal deficit present.      Mental Status: He is alert and oriented to person, place, and time.      Cranial Nerves: No cranial nerve deficit.      Sensory: No sensory deficit.      Motor: No weakness or abnormal muscle tone.      Coordination: Coordination normal.      Gait: Gait normal.         Results Reviewed       Procedure  Component Value Units Date/Time    HS Troponin I 2hr [[id number]]  (Normal) Collected: 05/11/25 1422    Lab Status: Final result Specimen: Blood from Arm, Left Updated: 05/11/25 1458     hs TnI 2hr 6 ng/L      Delta 2hr hsTnI 0 ng/L     HS Troponin I 4hr [[id number]]     Lab Status: No result Specimen: Blood     HS Troponin 0hr (reflex protocol) [[id number]]  (Normal) Collected: 05/11/25 1204    Lab Status: Final result Specimen: Blood from Arm, Left Updated: 05/11/25 1318     hs TnI 0hr 6 ng/L     B-Type Natriuretic Peptide(BNP) [[id number]]  (Normal) Collected: 05/11/25 1204    Lab Status: Final result Specimen: Blood from Arm, Left Updated: 05/11/25 1317     BNP 9 pg/mL     Basic metabolic panel [[id number]]  (Abnormal) Collected: 05/11/25 1204    Lab Status: Final result Specimen: Blood from Arm, Left Updated: 05/11/25 1316     Sodium 137 mmol/L      Potassium 3.4 mmol/L      Chloride 103 mmol/L      CO2 22 mmol/L      ANION GAP 12 mmol/L      BUN 21 mg/dL      Creatinine 1.10 mg/dL      Glucose 204 mg/dL      Calcium 10.0 mg/dL      eGFR 72 ml/min/1.73sq m     Narrative:      National Kidney Disease Foundation guidelines for Chronic Kidney Disease (CKD):     Stage 1 with normal or high GFR (GFR > 90 mL/min/1.73 square meters)    Stage 2 Mild CKD (GFR = 60-89 mL/min/1.73 square meters)    Stage 3A Moderate CKD (GFR = 45-59 mL/min/1.73 square meters)    Stage 3B Moderate CKD (GFR = 30-44 mL/min/1.73 square meters)    Stage 4 Severe CKD (GFR = 15-29 mL/min/1.73 square meters)    Stage 5 End Stage CKD (GFR <15 mL/min/1.73 square meters)  Note: GFR calculation is accurate only with a steady state creatinine    Hepatic function panel [[id number]]  (Normal) Collected: 05/11/25 1204    Lab Status: Final result Specimen: Blood from Arm, Left Updated: 05/11/25 1316     Total Bilirubin 0.97 mg/dL      Bilirubin, Direct 0.18 mg/dL      Alkaline Phosphatase 70 U/L      AST 16 U/L      ALT 22 U/L      Total Protein 8.0 g/dL       Albumin 4.5 g/dL     Lactic acid, plasma (w/reflex if result > 2.0) [[id number]]  (Abnormal) Collected: 05/11/25 1204    Lab Status: Final result Specimen: Blood from Arm, Left Updated: 05/11/25 1316     LACTIC ACID 2.8 mmol/L     Narrative:      Result may be elevated if tourniquet was used during collection.    Lactic acid 2 Hours [[id number]]     Lab Status: No result Specimen: Blood     FLU/COVID Rapid Antigen (30 min. TAT) - Preferred screening test in ED [[id number]]  (Normal) Collected: 05/11/25 1204    Lab Status: Final result Specimen: Nares from Nose Updated: 05/11/25 1312     SARS COV Rapid Antigen Negative     Influenza A Rapid Antigen Negative     Influenza B Rapid Antigen Negative    Narrative:      This test has been performed using the [employer] Marilynn 2 FLU+SARS Antigen test under the Emergency Use Authorization (EUA). This test has been validated by the  and verified by the performing laboratory. The Marilynn uses lateral flow immunofluorescent sandwich assay to detect SARS-COV, Influenza A and Influenza B Antigen.     The Quidel Marilynn 2 SARS Antigen test does not differentiate between SARS-CoV and SARS-CoV-2.     Negative results are presumptive and may be confirmed with a molecular assay, if necessary, for patient management. Negative results do not rule out SARS-CoV-2 or influenza infection and should not be used as the sole basis for treatment or patient management decisions. A negative test result may occur if the level of antigen in a sample is below the limit of detection of this test.     Positive results are indicative of the presence of viral antigens, but do not rule out bacterial infection or co-infection with other viruses.     All test results should be used as an adjunct to clinical observations and other information available to the provider.    FOR PEDIATRIC PATIENTS - copy/paste COVID Guidelines URL to browser:  [email or web address]    Protime-INR [[id number]]  (Normal) Collected: 05/11/25 1204    Lab Status: Final result Specimen: Blood from Arm, Left Updated: 05/11/25 1312     Protime 14.1 seconds      INR 1.02    Narrative:      INR Therapeutic Range    Indication                                             INR Range      Atrial Fibrillation                                               2.0-3.0  Hypercoagulable State                                    2.0.2.3  Left Ventricular Asist Device                            2.0-3.0  Mechanical Heart Valve                                  -    Aortic(with afib, MI, embolism, HF, LA enlargement,    and/or coagulopathy)                                     2.0-3.0 (2.5-3.5)     Mitral                                                             2.5-3.5  Prosthetic/Bioprosthetic Heart Valve               2.0-3.0  Venous thromboembolism (VTE: VT, PE        2.0-3.0    APTT [[id number]]  (Normal) Collected: 05/11/25 1204    Lab Status: Final result Specimen: Blood from Arm, Left Updated: 05/11/25 1312     PTT 31 seconds     CBC and differential [[id number]] Collected: 05/11/25 1204    Lab Status: Final result Specimen: Blood from Arm, Left Updated: 05/11/25 1257     WBC 9.26 Thousand/uL      RBC 5.02 Million/uL      Hemoglobin 16.0 g/dL      Hematocrit 47.4 %      MCV 94 fL      MCH 31.9 pg      MCHC 33.8 g/dL      RDW 12.3 %      MPV 11.0 fL      Platelets 260 Thousands/uL      nRBC 0 /100 WBCs      Segmented % 66 %      Immature Grans % 0 %      Lymphocytes % 25 %      Monocytes % 7 %      Eosinophils Relative 2 %      Basophils Relative 0 %      Absolute Neutrophils 6.00 Thousands/µL      Absolute Immature Grans 0.02 Thousand/uL      Absolute Lymphocytes 2.33 Thousands/µL      Absolute Monocytes 0.67 Thousand/µL      Eosinophils Absolute 0.20 Thousand/µL      Basophils Absolute 0.04 Thousands/µL     Fingerstick Glucose (POCT)  [[id number]]  (Abnormal) Collected: 25 1203    Lab Status: Final result Specimen: Blood Updated: 25 1205     POC Glucose 207 mg/dl             CTA chest pe study   Final Interpretation by Yahir Calle MD ( [id number])      1.  Poor opacification of pulmonary artery and branches. No pulmonary embolus.   2.   3.  Diffuse right lower lobe bronchial wall thickening with minimal mucous plugging. Reactive right hilar lymphadenopathy.   4.  Right lower lobe 5 mm nodule. Follow-up in 12 months if high risk.                  Workstation performed: [id number]         XR chest 1 view portable   Final Interpretation by Gail Tidwell MD ( [id number])      No acute cardiopulmonary disease.      See subsequent chest CT.      Workstation performed: [id number]             ECG 12 Lead Documentation Only    Date/Time: 2025 12:13 PM    Performed by: Reji Mcpherson MD  Authorized by: Reji Mcphersno MD    Indications / Diagnosis:  Dyspnea  ECG reviewed by me, the ED Provider: yes    Patient location:  ED  Previous ECG:     Previous ECG:  Unavailable  Interpretation:     Interpretation: non-specific    Rate:     ECG rate:  92    ECG rate assessment: normal    Rhythm:     Rhythm: sinus rhythm        ED Medication and Procedure Management   Prior to Admission Medications   Prescriptions Last Dose Informant Patient Reported? Taking?   Diclofenac Sodium (VOLTAREN) 1 %  Self No No   Sig: Apply 2 g topically 4 (four) times a day   amLODIPine (NORVASC) 10 mg tablet   No No   Sig: Take 1 tablet (10 mg total) by mouth daily   atorvastatin (LIPITOR) 20 mg tablet   No No   Sig: Take 1 tablet (20 mg total) by mouth daily   lisinopril (ZESTRIL) 40 mg tablet   No No   Sig: Take 1 tablet (40 mg total) by mouth daily   loratadine (CLARITIN) 10 mg tablet   No No   Si BY MOUTH EVERY DAY IN SPRING   methylPREDNISolone 4 MG tablet therapy pack   No No   Sig: Use as directed on package   metoprolol succinate (TOPROL-XL) 100  mg 24 hr tablet   No No   Sig: Take 1 tablet (100 mg total) by mouth daily      Facility-Administered Medications: None     Discharge Medication List as of 5/11/2025  3:22 PM        START taking these medications    Details   amoxicillin (AMOXIL) 500 mg capsule Take 2 capsules (1,000 mg total) by mouth 3 (three) times a day for 7 days, Starting Sun 5/11/2025, Until Sun 5/18/2025, Normal           CONTINUE these medications which have NOT CHANGED    Details   amLODIPine (NORVASC) 10 mg tablet Take 1 tablet (10 mg total) by mouth daily, Starting Tue 12/10/2024, Normal      atorvastatin (LIPITOR) 20 mg tablet Take 1 tablet (20 mg total) by mouth daily, Starting Tue 4/15/2025, Normal      Diclofenac Sodium (VOLTAREN) 1 % Apply 2 g topically 4 (four) times a day, Starting Mon 1/23/2023, Normal      lisinopril (ZESTRIL) 40 mg tablet Take 1 tablet (40 mg total) by mouth daily, Starting Tue 4/15/2025, Normal      loratadine (CLARITIN) 10 mg tablet 1 BY MOUTH EVERY DAY IN SPRING, Normal      methylPREDNISolone 4 MG tablet therapy pack Use as directed on package, Normal      metoprolol succinate (TOPROL-XL) 100 mg 24 hr tablet Take 1 tablet (100 mg total) by mouth daily, Starting Tue 12/31/2024, Normal           No discharge procedures on file.  ED SEPSIS DOCUMENTATION   Time reflects when diagnosis was documented in both MDM as applicable and the Disposition within this note       Time User Action Codes Description Comment    5/11/2025  3:21 PM Mcpherson, Reji Add [R06.00] Dyspnea     5/11/2025  3:21 PM Mcpherson, Reji Add [R11.10] Vomiting     5/11/2025  3:22 PM Mcpherson, Reji Add [R19.7] Diarrhea                  Reji Mcpherson MD  05/11/25 [id number]

## 2025-05-11 NOTE — DISCHARGE INSTRUCTIONS
1.  Poor opacification of pulmonary artery and branches. No pulmonary embolus.   2.   3. Diffuse right lower lobe bronchial wall thickening with minimal mucous plugging. Reactive right hilar lymphadenopathy.   4.  Right lower lobe 5 mm nodule. Follow-up in 12 months if high risk.

## 2025-05-11 NOTE — ED TRIAGE NOTES
Ambulatory w/sister w.complaint of vomiting and diarrhea since Tuesday; further complaint of cough; denies fever and/or chills; complaint of SOB which started Tuesday; denies chest pain

## 2025-05-13 ENCOUNTER — OFFICE VISIT (OUTPATIENT)
Dept: INTERNAL MEDICINE CLINIC | Facility: CLINIC | Age: 60
End: 2025-05-13
Payer: COMMERCIAL

## 2025-05-13 VITALS
SYSTOLIC BLOOD PRESSURE: 138 MMHG | DIASTOLIC BLOOD PRESSURE: 82 MMHG | OXYGEN SATURATION: 96 % | HEART RATE: 73 BPM | TEMPERATURE: 98.3 F | HEIGHT: 72 IN | WEIGHT: 249 LBS | BODY MASS INDEX: 33.72 KG/M2

## 2025-05-13 DIAGNOSIS — E87.6 HYPOKALEMIA: ICD-10-CM

## 2025-05-13 DIAGNOSIS — R53.83 OTHER FATIGUE: ICD-10-CM

## 2025-05-13 DIAGNOSIS — R73.01 ABNORMAL FASTING GLUCOSE: ICD-10-CM

## 2025-05-13 DIAGNOSIS — R06.00 DYSPNEA, UNSPECIFIED TYPE: ICD-10-CM

## 2025-05-13 DIAGNOSIS — Z13.6 SCREENING FOR HEART DISEASE: ICD-10-CM

## 2025-05-13 DIAGNOSIS — R05.1 ACUTE COUGH: Primary | ICD-10-CM

## 2025-05-13 DIAGNOSIS — R11.2 NAUSEA AND VOMITING, UNSPECIFIED VOMITING TYPE: ICD-10-CM

## 2025-05-13 DIAGNOSIS — Z12.5 SCREENING FOR PROSTATE CANCER: ICD-10-CM

## 2025-05-13 PROCEDURE — 99213 OFFICE O/P EST LOW 20 MIN: CPT

## 2025-05-13 RX ORDER — ONDANSETRON 4 MG/1
4 TABLET, FILM COATED ORAL EVERY 8 HOURS PRN
Qty: 20 TABLET | Refills: 0 | Status: SHIPPED | OUTPATIENT
Start: 2025-05-13 | End: 2025-05-21

## 2025-05-13 RX ORDER — AZITHROMYCIN 250 MG/1
TABLET, FILM COATED ORAL
Qty: 6 TABLET | Refills: 0 | Status: SHIPPED | OUTPATIENT
Start: 2025-05-13 | End: 2025-05-18

## 2025-05-13 NOTE — PROGRESS NOTES
Name: Ferdinand Menon      : 1965      MRN: [id number]  Encounter Provider: ROB Keller  Encounter Date: 2025   Encounter department: [facility] INTERNAL MEDICINE [place]  :  Assessment & Plan  Acute cough  Patient recovering from GI illness, sent home with amoxicillin from the ED.  Patient reports significant GI upset with penicillins.  Switched antibiotic to azithromycin.  Orders:  •  azithromycin (Zithromax) 250 mg tablet; Take 2 tablets (500 mg total) by mouth daily for 1 day, THEN 1 tablet (250 mg total) daily for 4 days.    Nausea and vomiting, unspecified vomiting type  Still recovering from GI illness.  Patient having a difficult time getting back onto a normal diet stating that he feels like he needs to gag when he tries to eat.  Gave instructions on bland diet.  Will give Zofran.  Referral to GI since this is a cyclic event.  Every 2 to 3 months he is experiencing this.  Orders:  •  ondansetron (ZOFRAN) 4 mg tablet; Take 1 tablet (4 mg total) by mouth every 8 (eight) hours as needed for nausea or vomiting  •  Ambulatory Referral to Gastroenterology; Future    Dyspnea, unspecified type  Patient still reports dyspnea.  Possibly due to aspiration during GI illness.  Increased fatigue.  Orders:  •  Vitamin D 25 hydroxy; Future    Hypokalemia  Recheck labs.       Abnormal fasting glucose  Previous A1C elevated. Due to be updated.  Orders:  •  Hemoglobin A1C; Future  •  Comprehensive metabolic panel; Future    Other fatigue    Orders:  •  Vitamin D 25 hydroxy; Future    Screening for prostate cancer    Orders:  •  PSA, Total Screen; Future    Screening for heart disease    Orders:  •  Lipid panel; Future          Depression Screening and Follow-up Plan: Patient was screened for depression during today's encounter. They screened negative with a PHQ-2 score of 0.        History of Present Illness {?Quick Links Encounters * My Last Note * Last Note in Specialty * Snapshot * Since Last  Visit * History :31104}  Ferdinand is here today for follow up from his recent trip to the ED on 5/11 for dyspnea. He presented with vomiting and diarrhea for 4 to 5 days with cough and shortness of breath. Cardiac concerns and PE ruled out. Patient given a fluid bolus and discharged with amoxicillin. He says it's like an elephant sitting on his chest with cough that is productive at times. He was able to  the antibiotics on Monday and started it then. He's noticed every 2-3 months he's been getting.  He is concerned that there is something systemically wrong with him thus causing him to get sick cyclically.  Always starts with vomiting and diarrhea progresses to shortness of breath.  He denies acid reflux.  Denies fever or bodyaches.  Denies allergies. He eats cold eeze gummies daily to boost his immune system.       Review of Systems   Constitutional:  Positive for activity change, appetite change and fatigue. Negative for chills and fever.   HENT:  Positive for congestion. Negative for ear pain, rhinorrhea, sinus pressure and sore throat.    Eyes:  Negative for pain and visual disturbance.   Respiratory:  Positive for cough, chest tightness and shortness of breath.    Cardiovascular:  Negative for chest pain, palpitations and leg swelling.   Gastrointestinal:  Positive for abdominal pain, diarrhea and nausea. Negative for abdominal distention, anal bleeding, blood in stool, constipation and vomiting.   Endocrine: Negative.    Genitourinary:  Negative for dysuria, frequency and hematuria.   Musculoskeletal:  Negative for arthralgias, back pain and myalgias.   Skin:  Negative for color change and rash.   Allergic/Immunologic: Negative.    Neurological:  Negative for dizziness, seizures, syncope, light-headedness and headaches.   Hematological: Negative.    Psychiatric/Behavioral: Negative.     All other systems reviewed and are negative.      Objective {?Quick Links Trend Vitals * Enter New Vitals * Results  Review * Timeline (Adult) * Labs * Imaging * Cardiology * Procedures * Lung Cancer Screening * Surgical eConsent :[id number]}  /82 (BP Location: Right arm, Patient Position: Sitting, Cuff Size: Large)   Pulse 73   Temp 98.3 °F (36.8 °C)   Ht 6' (1.829 m)   Wt 113 kg (249 lb)   SpO2 96%   BMI 33.77 kg/m²      Physical Exam  Vitals and nursing note reviewed.   Constitutional:       General: He is not in acute distress.     Appearance: He is well-developed.   HENT:      Right Ear: Tympanic membrane normal.      Left Ear: Tympanic membrane normal.      Nose: Nose normal.      Mouth/Throat:      Pharynx: Postnasal drip present. No posterior oropharyngeal erythema.   Cardiovascular:      Rate and Rhythm: Normal rate and regular rhythm.      Pulses: Normal pulses.      Heart sounds: Normal heart sounds. No murmur heard.  Pulmonary:      Effort: Pulmonary effort is normal. No respiratory distress.      Breath sounds: Normal breath sounds.   Abdominal:      General: Bowel sounds are normal. There is distension.      Palpations: Abdomen is soft.      Tenderness: There is no abdominal tenderness.   Musculoskeletal:         General: No swelling. Normal range of motion.      Cervical back: Normal range of motion and neck supple.   Lymphadenopathy:      Cervical: No cervical adenopathy.   Skin:     General: Skin is warm and dry.   Neurological:      Mental Status: He is alert and oriented to person, place, and time.   Psychiatric:         Mood and Affect: Mood normal.

## 2025-05-20 ENCOUNTER — NURSE TRIAGE (OUTPATIENT)
Age: 60
End: 2025-05-20

## 2025-05-20 NOTE — TELEPHONE ENCOUNTER
"FOLLOW UP: An appointment was made for 5/21/25    REASON FOR CONVERSATION: Sinusitis    SYMPTOMS: Patient called today c/o sinus pain/sinus headache that is affecting his equilibrium. Has mild congestion. Patient states sinus pain is severe, takes advil which partially alleviates the pain. Admits to sore throat, has been waking up sweaty the last few mornings, has not taken temperature.    OTHER: Patient seen in office last week and finished z pack. States chest feels much better but now experiencing sinus pain and mild congestion.     DISPOSITION: See Today or Tomorrow in Office        Reason for Disposition   Patient wants to be seen    Answer Assessment - Initial Assessment Questions  1. LOCATION: \"Where does it hurt?\"       Headache -frontal part of head/sinuses  2. ONSET: \"When did the sinus pain start?\"  (e.g., hours, days)       This past Sunday   3. SEVERITY: \"How bad is the pain?\"   (Scale 0-10; or none, mild, moderate or severe)      Severe, takes advil and then it is mild   5. NASAL CONGESTION: \"Is the nose blocked?\" If Yes, ask: \"Can you open it or must you breathe through your mouth?\"      Can breathe out of nose a little bit, mild congestion  6. NASAL DISCHARGE: \"Do you have discharge from your nose?\" If so ask, \"What color?\"      Denies   7. FEVER: \"Do you have a fever?\" If Yes, ask: \"What is it, how was it measured, and when did it start?\"       Denies fever, woke up sweaty a few mornings  8. OTHER SYMPTOMS: \"Do you have any other symptoms?\" (e.g., sore throat, cough, earache, difficulty breathing)      Throat sore,    Protocols used: Sinus Pain or Congestion-Adult-[place]    "

## 2025-05-21 ENCOUNTER — OFFICE VISIT (OUTPATIENT)
Dept: INTERNAL MEDICINE CLINIC | Facility: CLINIC | Age: 60
End: 2025-05-21
Payer: COMMERCIAL

## 2025-05-21 VITALS
SYSTOLIC BLOOD PRESSURE: 140 MMHG | OXYGEN SATURATION: 96 % | BODY MASS INDEX: 33.77 KG/M2 | HEART RATE: 81 BPM | HEIGHT: 72 IN | DIASTOLIC BLOOD PRESSURE: 86 MMHG

## 2025-05-21 DIAGNOSIS — R06.00 DYSPNEA, UNSPECIFIED TYPE: ICD-10-CM

## 2025-05-21 DIAGNOSIS — J01.00 SUBACUTE MAXILLARY SINUSITIS: Primary | ICD-10-CM

## 2025-05-21 DIAGNOSIS — Z91.89 RISK OF EXPOSURE TO LYME DISEASE: ICD-10-CM

## 2025-05-21 PROCEDURE — 99214 OFFICE O/P EST MOD 30 MIN: CPT | Performed by: INTERNAL MEDICINE

## 2025-05-21 RX ORDER — FLUTICASONE PROPIONATE 50 MCG
2 SPRAY, SUSPENSION (ML) NASAL DAILY
Qty: 16 G | Refills: 5 | Status: SHIPPED | OUTPATIENT
Start: 2025-05-21

## 2025-05-21 RX ORDER — METHYLPREDNISOLONE 4 MG/1
TABLET ORAL
Qty: 21 EACH | Refills: 0 | Status: SHIPPED | OUTPATIENT
Start: 2025-05-21

## 2025-05-21 NOTE — PROGRESS NOTES
"Name: Ferdinand Menon      : 1965      MRN: [id number]  Encounter Provider: Jarocho Thomas MD  Encounter Date: 2025   Encounter department: [facility] INTERNAL MEDICINE [place]  :  Assessment & Plan  Subacute maxillary sinusitis  Reports feeling better since completing his Z-pack, he was last seen by this office in the past couple of weeks, reports some worsening congestion, feels \"off balance\", night sweats. 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 Denies fever.     He will call us on Friday with status update. Consider ENT in the future, also adding Singulair.   Orders:  •  methylPREDNISolone 4 MG tablet therapy pack; Use as directed on package  •  fluticasone (FLONASE) 50 mcg/act nasal spray; 2 sprays into each nostril daily    Dyspnea, unspecified type  Resolved.       Risk of exposure to Lyme disease  Reports pulling 3 ticks off his arm recently. Denies rashes.  Orders:  •  Lyme Total AB W Reflex to IGM/IGG; Future          Depression Screening and Follow-up Plan:   Patient with underlying depression and was advised to continue current medications as prescribed.     History of Present Illness   Here with few concerns.  Review of Systems   Constitutional:  Negative for chills and fever.   HENT:  Positive for congestion and postnasal drip. Negative for ear pain.    Eyes:  Negative for pain and visual disturbance.   Respiratory:  Positive for cough. Negative for shortness of breath.    Cardiovascular:  Negative for chest pain and palpitations.   Gastrointestinal:  Negative for abdominal pain and vomiting.   Genitourinary:  Negative for dysuria and hematuria.   Musculoskeletal:  Negative for arthralgias and back pain.   Skin:  Negative for color change and rash.   Neurological:  Negative for seizures and syncope.   All other systems reviewed and are negative.      Objective   /86 (BP Location: Left arm, Patient Position: Sitting, Cuff Size: Large)   Pulse 81   Ht 6' (1.829 m)  "  SpO2 96%   BMI 33.77 kg/m²      Physical Exam  Vitals and nursing note reviewed.   Constitutional:       General: He is not in acute distress.     Appearance: He is well-developed. He is obese.   HENT:      Head: Atraumatic.      Right Ear: A middle ear effusion is present.      Left Ear: A middle ear effusion is present.      Mouth/Throat:      Mouth: Mucous membranes are moist.      Pharynx: Posterior oropharyngeal erythema and postnasal drip present.     Eyes:      Conjunctiva/sclera: Conjunctivae normal.       Cardiovascular:      Rate and Rhythm: Normal rate.      Heart sounds: No murmur heard.  Pulmonary:      Effort: Pulmonary effort is normal. No respiratory distress.      Breath sounds: Normal breath sounds.   Abdominal:      Tenderness: There is no abdominal tenderness.     Musculoskeletal:         General: No swelling.     Skin:     General: Skin is dry.     Neurological:      Mental Status: He is alert and oriented to person, place, and time.     Psychiatric:         Mood and Affect: Mood normal.

## 2025-06-03 ENCOUNTER — APPOINTMENT (OUTPATIENT)
Dept: LAB | Facility: HOSPITAL | Age: 60
End: 2025-06-03
Attending: INTERNAL MEDICINE
Payer: COMMERCIAL

## 2025-06-03 DIAGNOSIS — R53.83 OTHER FATIGUE: ICD-10-CM

## 2025-06-03 DIAGNOSIS — R73.01 ABNORMAL FASTING GLUCOSE: ICD-10-CM

## 2025-06-03 DIAGNOSIS — R06.00 DYSPNEA, UNSPECIFIED TYPE: ICD-10-CM

## 2025-06-03 DIAGNOSIS — Z12.5 SCREENING FOR PROSTATE CANCER: ICD-10-CM

## 2025-06-03 DIAGNOSIS — Z13.6 SCREENING FOR HEART DISEASE: ICD-10-CM

## 2025-06-03 DIAGNOSIS — Z91.89 RISK OF EXPOSURE TO LYME DISEASE: ICD-10-CM

## 2025-06-03 LAB
25(OH)D3 SERPL-MCNC: 22.4 NG/ML (ref 30–100)
ALBUMIN SERPL BCG-MCNC: 4 G/DL (ref 3.5–5)
ALP SERPL-CCNC: 61 U/L (ref 34–104)
ALT SERPL W P-5'-P-CCNC: 62 U/L (ref 7–52)
ANION GAP SERPL CALCULATED.3IONS-SCNC: 6 MMOL/L (ref 4–13)
AST SERPL W P-5'-P-CCNC: 23 U/L (ref 13–39)
B BURGDOR IGG+IGM SER QL IA: NEGATIVE
BILIRUB SERPL-MCNC: 0.47 MG/DL (ref 0.2–1)
BUN SERPL-MCNC: 12 MG/DL (ref 5–25)
CALCIUM SERPL-MCNC: 9 MG/DL (ref 8.4–10.2)
CHLORIDE SERPL-SCNC: 106 MMOL/L (ref 96–108)
CHOLEST SERPL-MCNC: 186 MG/DL (ref ?–200)
CO2 SERPL-SCNC: 29 MMOL/L (ref 21–32)
CREAT SERPL-MCNC: 1.02 MG/DL (ref 0.6–1.3)
EST. AVERAGE GLUCOSE BLD GHB EST-MCNC: 126 MG/DL
GFR SERPL CREATININE-BSD FRML MDRD: 78 ML/MIN/1.73SQ M
GLUCOSE P FAST SERPL-MCNC: 131 MG/DL (ref 65–99)
HBA1C MFR BLD: 6 %
HDLC SERPL-MCNC: 39 MG/DL
LDLC SERPL CALC-MCNC: 115 MG/DL (ref 0–100)
NONHDLC SERPL-MCNC: 147 MG/DL
POTASSIUM SERPL-SCNC: 4.2 MMOL/L (ref 3.5–5.3)
PROT SERPL-MCNC: 6.8 G/DL (ref 6.4–8.4)
PSA SERPL-MCNC: 5.72 NG/ML (ref 0–4)
SODIUM SERPL-SCNC: 141 MMOL/L (ref 135–147)
TRIGL SERPL-MCNC: 162 MG/DL (ref ?–150)

## 2025-06-03 PROCEDURE — 86618 LYME DISEASE ANTIBODY: CPT

## 2025-06-03 PROCEDURE — 83036 HEMOGLOBIN GLYCOSYLATED A1C: CPT

## 2025-06-03 PROCEDURE — 82306 VITAMIN D 25 HYDROXY: CPT

## 2025-06-03 PROCEDURE — 80061 LIPID PANEL: CPT

## 2025-06-03 PROCEDURE — 80053 COMPREHEN METABOLIC PANEL: CPT

## 2025-06-03 PROCEDURE — G0103 PSA SCREENING: HCPCS

## 2025-06-03 PROCEDURE — 36415 COLL VENOUS BLD VENIPUNCTURE: CPT

## 2025-06-04 ENCOUNTER — RESULTS FOLLOW-UP (OUTPATIENT)
Dept: INTERNAL MEDICINE CLINIC | Facility: CLINIC | Age: 60
End: 2025-06-04

## 2025-06-04 DIAGNOSIS — R97.20 ELEVATED PSA: Primary | ICD-10-CM

## 2025-06-12 ENCOUNTER — APPOINTMENT (OUTPATIENT)
Dept: LAB | Facility: HOSPITAL | Age: 60
End: 2025-06-12
Payer: COMMERCIAL

## 2025-06-12 DIAGNOSIS — R97.20 ELEVATED PSA: ICD-10-CM

## 2025-06-12 LAB
PSA FREE MFR SERPL: 11.93 %
PSA FREE SERPL-MCNC: 0.62 NG/ML
PSA SERPL-MCNC: 5.16 NG/ML (ref 0–4)

## 2025-06-12 PROCEDURE — 36415 COLL VENOUS BLD VENIPUNCTURE: CPT

## 2025-06-12 PROCEDURE — 84153 ASSAY OF PSA TOTAL: CPT

## 2025-06-12 PROCEDURE — 84154 ASSAY OF PSA FREE: CPT

## 2025-06-13 ENCOUNTER — RESULTS FOLLOW-UP (OUTPATIENT)
Dept: INTERNAL MEDICINE CLINIC | Facility: CLINIC | Age: 60
End: 2025-06-13

## 2025-06-16 DIAGNOSIS — I10 ESSENTIAL HYPERTENSION: ICD-10-CM

## 2025-06-16 RX ORDER — AMLODIPINE BESYLATE 10 MG/1
10 TABLET ORAL DAILY
Qty: 30 TABLET | Refills: 5 | Status: SHIPPED | OUTPATIENT
Start: 2025-06-16 | End: 2025-06-18 | Stop reason: SDUPTHER

## 2025-06-18 ENCOUNTER — OFFICE VISIT (OUTPATIENT)
Dept: INTERNAL MEDICINE CLINIC | Facility: CLINIC | Age: 60
End: 2025-06-18
Payer: COMMERCIAL

## 2025-06-18 VITALS
SYSTOLIC BLOOD PRESSURE: 140 MMHG | OXYGEN SATURATION: 94 % | BODY MASS INDEX: 34.54 KG/M2 | HEART RATE: 72 BPM | RESPIRATION RATE: 12 BRPM | HEIGHT: 72 IN | WEIGHT: 255 LBS | DIASTOLIC BLOOD PRESSURE: 86 MMHG

## 2025-06-18 DIAGNOSIS — R73.03 PREDIABETES: ICD-10-CM

## 2025-06-18 DIAGNOSIS — R74.01 ELEVATED ALT MEASUREMENT: ICD-10-CM

## 2025-06-18 DIAGNOSIS — Z00.00 ANNUAL PHYSICAL EXAM: ICD-10-CM

## 2025-06-18 DIAGNOSIS — I10 PRIMARY HYPERTENSION: Primary | ICD-10-CM

## 2025-06-18 DIAGNOSIS — R97.20 ELEVATED PSA: ICD-10-CM

## 2025-06-18 DIAGNOSIS — E78.2 MIXED HYPERLIPIDEMIA: ICD-10-CM

## 2025-06-18 PROCEDURE — 99396 PREV VISIT EST AGE 40-64: CPT | Performed by: INTERNAL MEDICINE

## 2025-06-18 PROCEDURE — 99214 OFFICE O/P EST MOD 30 MIN: CPT | Performed by: INTERNAL MEDICINE

## 2025-06-18 RX ORDER — AMLODIPINE BESYLATE 10 MG/1
10 TABLET ORAL DAILY
Qty: 90 TABLET | Refills: 1 | Status: SHIPPED | OUTPATIENT
Start: 2025-06-18

## 2025-06-18 RX ORDER — LISINOPRIL 40 MG/1
40 TABLET ORAL DAILY
Qty: 90 TABLET | Refills: 0 | Status: SHIPPED | OUTPATIENT
Start: 2025-06-18

## 2025-06-18 RX ORDER — ATORVASTATIN CALCIUM 20 MG/1
20 TABLET, FILM COATED ORAL DAILY
Qty: 90 TABLET | Refills: 0 | Status: SHIPPED | OUTPATIENT
Start: 2025-06-18

## 2025-06-18 RX ORDER — METOPROLOL SUCCINATE 100 MG/1
100 TABLET, EXTENDED RELEASE ORAL DAILY
Qty: 90 TABLET | Refills: 1 | Status: SHIPPED | OUTPATIENT
Start: 2025-06-18

## 2025-06-18 NOTE — PROGRESS NOTES
Adult Annual Physical  Name: Ferdinand Menon      : 1965      MRN: [id number]  Encounter Provider: Jarocho Thomas MD  Encounter Date: 2025   Encounter department: [facility] INTERNAL MEDICINE [place]    :  Assessment & Plan  Annual physical exam  Completed.       Primary hypertension  Controlled with current regimen.  Orders:    amLODIPine (NORVASC) 10 mg tablet; Take 1 tablet (10 mg total) by mouth daily    lisinopril (ZESTRIL) 40 mg tablet; Take 1 tablet (40 mg total) by mouth daily    metoprolol succinate (TOPROL-XL) 100 mg 24 hr tablet; Take 1 tablet (100 mg total) by mouth daily    Mixed hyperlipidemia  The 10-year ASCVD risk score (Darlin VALDEZ, et al., 2019) is: 14.1%    Values used to calculate the score:      Age: 59 years      Clincally relevant sex: Male      Is Non- : No      Diabetic: No      Tobacco smoker: No      Systolic Blood Pressure: 140 mmHg      Is BP treated: Yes      HDL Cholesterol: 39 mg/dL      Total Cholesterol: 186 mg/dL  Lipids worsened, taking statin, he gained weight.  Orders:    atorvastatin (LIPITOR) 20 mg tablet; Take 1 tablet (20 mg total) by mouth daily    Lipid Panel with Direct LDL reflex; Future    Elevated PSA  X 2 occasions but trending down form 5.7-- 5.1. does report urinary frequency.  Orders:    Ambulatory Referral to Urology; Future    Prediabetes  A1c improved.  Orders:    Hemoglobin A1C; Future    Elevated ALT measurement  Recheck ALT.  Orders:    Comprehensive metabolic panel; Future        Preventive Screenings:  - Diabetes Screening: screening up-to-date  - Cholesterol Screening: has hyperlipidemia and screening up-to-date   - Hepatitis C screening: screening up-to-date   - HIV screening: screening up-to-date   - Colon cancer screening: screening up-to-date   - Lung cancer screening: screening not indicated   - Prostate cancer screening: screening up-to-date     Immunizations:  - Immunizations due: Prevnar 20, Tdap and  Zoster (Shingrix)    Counseling/Anticipatory Guidance:    - Diet: discussed recommendations for a healthy/well-balanced diet.   - Exercise: the importance of regular exercise/physical activity was discussed. Recommend exercise 3-5 times per week for at least 30 minutes.   - Injury prevention: discussed safety/seat belts, safety helmets, smoke detectors, carbon monoxide detectors, and smoking near bedding or upholstery.       Depression Screening and Follow-up Plan: Patient was screened for depression during today's encounter. They screened negative with a PHQ-2 score of 0.          History of Present Illness     Adult Annual Physical:  Patient presents for annual physical.     Diet and Physical Activity:  - Diet/Nutrition: no special diet.  - Exercise: no formal exercise.    Depression Screening:  - PHQ-2 Score: 0    General Health:  - Sleep: sleeps well.  - Hearing: normal hearing bilateral ears.  - Vision: no vision problems.  - Dental: regular dental visits.     Health:  - History of STDs: no.   - Urinary symptoms: none and urinary frequency.     Advanced Care Planning:  - Has an advanced directive?: no    - Has a durable medical POA?: no    - ACP document given to patient?: no      Review of Systems   Constitutional:  Negative for chills and fever.   HENT:  Negative for ear pain and sore throat.    Eyes:  Negative for pain and visual disturbance.   Respiratory:  Negative for cough and shortness of breath.    Cardiovascular:  Negative for chest pain and palpitations.   Gastrointestinal:  Negative for abdominal pain and vomiting.   Genitourinary:  Negative for dysuria and hematuria.   Musculoskeletal:  Negative for arthralgias and back pain.   Skin:  Negative for color change and rash.   Neurological:  Negative for seizures and syncope.   All other systems reviewed and are negative.    Past Medical History   Past Medical History[1]  Past Surgical History[2]  Family History[3]   reports that he has never smoked.  He has never used smokeless tobacco. He reports current alcohol use. He reports current drug use. Drug: Marijuana.  Current Outpatient Medications   Medication Instructions    amLODIPine (NORVASC) 10 mg, Oral, Daily    atorvastatin (LIPITOR) 20 mg, Oral, Daily    Diclofenac Sodium (VOLTAREN) 2 g, Topical, 4 times daily    fluticasone (FLONASE) 50 mcg/act nasal spray 2 sprays, Nasal, Daily    lisinopril (ZESTRIL) 40 mg, Oral, Daily    loratadine (CLARITIN) 10 mg tablet 1 BY MOUTH EVERY DAY IN SPRING    metoprolol succinate (TOPROL-XL) 100 mg, Oral, Daily   Allergies[4]     Objective   /86 (BP Location: Left arm, Patient Position: Sitting, Cuff Size: Adult)   Pulse 72   Resp 12   Ht 6' (1.829 m)   Wt 116 kg (255 lb)   SpO2 94%   BMI 34.58 kg/m²     Physical Exam  Vitals and nursing note reviewed.   Constitutional:       General: He is not in acute distress.     Appearance: He is well-developed. He is obese.   HENT:      Head: Normocephalic and atraumatic.     Cardiovascular:      Rate and Rhythm: Normal rate and regular rhythm.      Heart sounds: No murmur heard.  Pulmonary:      Effort: Pulmonary effort is normal. No respiratory distress.      Breath sounds: Normal breath sounds.   Abdominal:      Tenderness: There is no abdominal tenderness.     Musculoskeletal:         General: No swelling.      Cervical back: Normal range of motion and neck supple.   Lymphadenopathy:      Cervical: No cervical adenopathy.     Skin:     General: Skin is warm and dry.     Neurological:      General: No focal deficit present.      Mental Status: He is alert and oriented to person, place, and time. Mental status is at baseline.     Psychiatric:         Mood and Affect: Mood normal.              [1]   Past Medical History:  Diagnosis Date    Hyperlipemia     Hypertension    [2]   Past Surgical History:  Procedure Laterality Date    EGD     [3]   Family History  Problem Relation Name Age of Onset    Cancer Mother       Aneurysm Mother      Heart disease Father      Coronary artery disease Father      Diabetes Family FX    [4]   Allergies  Allergen Reactions    Augmentin [Amoxicillin-Pot Clavulanate] GI Intolerance

## 2025-06-18 NOTE — ASSESSMENT & PLAN NOTE
Controlled with current regimen.  Orders:    amLODIPine (NORVASC) 10 mg tablet; Take 1 tablet (10 mg total) by mouth daily    lisinopril (ZESTRIL) 40 mg tablet; Take 1 tablet (40 mg total) by mouth daily    metoprolol succinate (TOPROL-XL) 100 mg 24 hr tablet; Take 1 tablet (100 mg total) by mouth daily

## 2025-06-18 NOTE — ASSESSMENT & PLAN NOTE
The 10-year ASCVD risk score (Darlin VALDEZ, et al., 2019) is: 14.1%    Values used to calculate the score:      Age: 59 years      Clincally relevant sex: Male      Is Non- : No      Diabetic: No      Tobacco smoker: No      Systolic Blood Pressure: 140 mmHg      Is BP treated: Yes      HDL Cholesterol: 39 mg/dL      Total Cholesterol: 186 mg/dL  Lipids worsened, taking statin, he gained weight.  Orders:    atorvastatin (LIPITOR) 20 mg tablet; Take 1 tablet (20 mg total) by mouth daily    Lipid Panel with Direct LDL reflex; Future

## 2025-07-14 DIAGNOSIS — J01.10 ACUTE NON-RECURRENT FRONTAL SINUSITIS: ICD-10-CM

## 2025-07-14 RX ORDER — LORATADINE 10 MG/1
TABLET ORAL
Qty: 90 TABLET | Refills: 1 | Status: SHIPPED | OUTPATIENT
Start: 2025-07-14